# Patient Record
Sex: FEMALE | Race: WHITE | NOT HISPANIC OR LATINO | Employment: OTHER | URBAN - METROPOLITAN AREA
[De-identification: names, ages, dates, MRNs, and addresses within clinical notes are randomized per-mention and may not be internally consistent; named-entity substitution may affect disease eponyms.]

---

## 2020-03-03 ENCOUNTER — PATIENT MESSAGE (OUTPATIENT)
Dept: OBSTETRICS AND GYNECOLOGY | Facility: CLINIC | Age: 70
End: 2020-03-03

## 2020-03-03 ENCOUNTER — OFFICE VISIT (OUTPATIENT)
Dept: OBSTETRICS AND GYNECOLOGY | Facility: CLINIC | Age: 70
End: 2020-03-03
Payer: MEDICARE

## 2020-03-03 VITALS
SYSTOLIC BLOOD PRESSURE: 118 MMHG | DIASTOLIC BLOOD PRESSURE: 90 MMHG | WEIGHT: 114.5 LBS | BODY MASS INDEX: 22.48 KG/M2 | HEIGHT: 60 IN

## 2020-03-03 DIAGNOSIS — Z01.419 WELL WOMAN EXAM WITH ROUTINE GYNECOLOGICAL EXAM: Primary | ICD-10-CM

## 2020-03-03 PROCEDURE — G0101 CA SCREEN;PELVIC/BREAST EXAM: HCPCS | Mod: PBBFAC,PO | Performed by: OBSTETRICS & GYNECOLOGY

## 2020-03-03 PROCEDURE — 99203 OFFICE O/P NEW LOW 30 MIN: CPT | Mod: PBBFAC,PO,25 | Performed by: OBSTETRICS & GYNECOLOGY

## 2020-03-03 PROCEDURE — 88175 CYTOPATH C/V AUTO FLUID REDO: CPT

## 2020-03-03 PROCEDURE — G0101 PR CA SCREEN;PELVIC/BREAST EXAM: ICD-10-PCS | Mod: S$PBB,,, | Performed by: OBSTETRICS & GYNECOLOGY

## 2020-03-03 PROCEDURE — G0101 CA SCREEN;PELVIC/BREAST EXAM: HCPCS | Mod: S$PBB,,, | Performed by: OBSTETRICS & GYNECOLOGY

## 2020-03-03 PROCEDURE — 99999 PR PBB SHADOW E&M-NEW PATIENT-LVL III: CPT | Mod: PBBFAC,,, | Performed by: OBSTETRICS & GYNECOLOGY

## 2020-03-03 PROCEDURE — 99999 PR PBB SHADOW E&M-NEW PATIENT-LVL III: ICD-10-PCS | Mod: PBBFAC,,, | Performed by: OBSTETRICS & GYNECOLOGY

## 2020-03-03 NOTE — PROGRESS NOTES
HPI:   69 y.o.   OB History        0    Para   0    Term   0       0    AB   0    Living   0       SAB   0    TAB   0    Ectopic   0    Multiple   0    Live Births                  No LMP recorded. Patient is postmenopausal.    Patient is  here for her annual gynecologic exam.  She has no complaints.     ROS:  GENERAL: No fever, chills, fatigability or weight loss.  SKIN: No rashes, itching or changes in color or texture of skin.  HEAD: No headaches or recent head trauma.  EYES: Visual acuity fine. No photophobia, ocular pain or diplopia.  EARS: Denies ear pain, discharge or vertigo.  NOSE: No loss of smell, no epistaxis or postnasal drip.  MOUTH & THROAT: No hoarseness or change in voice. No excessive gum bleeding.  NODES: Denies swollen glands.  CHEST: Denies BURKS, cyanosis, wheezing, cough and sputum production.  CARDIOVASCULAR: Denies chest pain, PND, orthopnea or reduced exercise tolerance.  ABDOMEN: Appetite fine. No weight loss. Denies diarrhea, abdominal pain, hematemesis or blood in stool.  URINARY: No flank pain, dysuria or hematuria.  PERIPHERAL VASCULAR: No claudication or cyanosis.  MUSCULOSKELETAL: No joint stiffness or swelling. Denies back pain.  NEUROLOGIC: No history of seizures, paralysis, alteration of gait or coordination.    PE:   BP (!) 118/90   Ht 5' (1.524 m)   Wt 51.9 kg (114 lb 8.5 oz)   BMI 22.37 kg/m²   APPEARANCE: Well nourished, well developed, in no acute distress.  NECK: Neck symmetric without masses or thyromegaly.  BREASTS: Symmetrical, no skin changes or visible lesions. No palpable masses, nipple discharge or adenopathy bilaterally.  ABDOMEN: Flat. Soft. No tenderness or masses. No hepatosplenomegaly. No hernias. No CVA tenderness.  VULVA: No lesions. Normal female genitalia.  URETHRAL MEATUS: Normal size and location, no lesions, no prolapse.  URETHRA: No masses, tenderness, prolapse or scarring.  VAGINA: Moist and well rugated, no discharge, no significant  cystocele or rectocele.  CERVIX: No lesions and discharge. PAP done.  UTERUS: Normal size, regular shape, mobile, non-tender, bladder base nontender.  ADNEXA: No masses, tenderness or CDS nodularity.  ANUS PERINEUM: Normal.    PROCEDURES:  Pap smear    Assessment:  Normal Gynecologic Exam    Plan:  Mammogram and Colonoscopy if indicated by current recommendations.  Return to clinic in one year or for any problems or complaints.  No gyn co  Needs to return to dis for mammo views

## 2020-03-30 LAB
FINAL PATHOLOGIC DIAGNOSIS: NORMAL
Lab: NORMAL

## 2020-03-31 ENCOUNTER — TELEPHONE (OUTPATIENT)
Dept: OBSTETRICS AND GYNECOLOGY | Facility: CLINIC | Age: 70
End: 2020-03-31

## 2020-03-31 NOTE — TELEPHONE ENCOUNTER
----- Message from Michael A. Wiedemann, MD sent at 3/31/2020 12:05 PM CDT -----  Notify the patient that her pap was normal

## 2020-11-23 ENCOUNTER — PATIENT MESSAGE (OUTPATIENT)
Dept: OBSTETRICS AND GYNECOLOGY | Facility: CLINIC | Age: 70
End: 2020-11-23

## 2021-02-24 ENCOUNTER — IMMUNIZATION (OUTPATIENT)
Dept: OBSTETRICS AND GYNECOLOGY | Facility: CLINIC | Age: 71
End: 2021-02-24
Payer: MEDICARE

## 2021-02-24 DIAGNOSIS — Z23 NEED FOR VACCINATION: Primary | ICD-10-CM

## 2021-02-24 PROCEDURE — 91300 COVID-19, MRNA, LNP-S, PF, 30 MCG/0.3 ML DOSE VACCINE: CPT | Mod: PBBFAC | Performed by: FAMILY MEDICINE

## 2021-03-10 ENCOUNTER — PATIENT OUTREACH (OUTPATIENT)
Dept: ADMINISTRATIVE | Facility: HOSPITAL | Age: 71
End: 2021-03-10

## 2021-03-10 ENCOUNTER — OFFICE VISIT (OUTPATIENT)
Dept: INTERNAL MEDICINE | Facility: CLINIC | Age: 71
End: 2021-03-10
Payer: MEDICARE

## 2021-03-10 VITALS
RESPIRATION RATE: 16 BRPM | WEIGHT: 112.44 LBS | BODY MASS INDEX: 22.07 KG/M2 | HEART RATE: 88 BPM | HEIGHT: 60 IN | SYSTOLIC BLOOD PRESSURE: 114 MMHG | DIASTOLIC BLOOD PRESSURE: 78 MMHG | TEMPERATURE: 98 F | OXYGEN SATURATION: 97 %

## 2021-03-10 DIAGNOSIS — Z12.11 SCREEN FOR COLON CANCER: ICD-10-CM

## 2021-03-10 DIAGNOSIS — E78.5 HYPERLIPIDEMIA, UNSPECIFIED HYPERLIPIDEMIA TYPE: ICD-10-CM

## 2021-03-10 DIAGNOSIS — Z00.00 ANNUAL PHYSICAL EXAM: ICD-10-CM

## 2021-03-10 DIAGNOSIS — E03.8 OTHER SPECIFIED HYPOTHYROIDISM: Primary | Chronic | ICD-10-CM

## 2021-03-10 PROCEDURE — 99204 PR OFFICE/OUTPT VISIT, NEW, LEVL IV, 45-59 MIN: ICD-10-PCS | Mod: S$PBB,,, | Performed by: INTERNAL MEDICINE

## 2021-03-10 PROCEDURE — 99999 PR PBB SHADOW E&M-EST. PATIENT-LVL III: ICD-10-PCS | Mod: PBBFAC,,, | Performed by: INTERNAL MEDICINE

## 2021-03-10 PROCEDURE — 99999 PR PBB SHADOW E&M-EST. PATIENT-LVL III: CPT | Mod: PBBFAC,,, | Performed by: INTERNAL MEDICINE

## 2021-03-10 PROCEDURE — 99204 OFFICE O/P NEW MOD 45 MIN: CPT | Mod: S$PBB,,, | Performed by: INTERNAL MEDICINE

## 2021-03-10 PROCEDURE — 99213 OFFICE O/P EST LOW 20 MIN: CPT | Mod: PBBFAC,PO | Performed by: INTERNAL MEDICINE

## 2021-03-10 RX ORDER — LEVOTHYROXINE SODIUM 50 UG/1
50 TABLET ORAL
Qty: 90 TABLET | Refills: 3 | Status: SHIPPED | OUTPATIENT
Start: 2021-03-10 | End: 2022-05-09

## 2021-03-10 RX ORDER — VARICELLA-ZOSTER GE VAC,2 OF 2 50 MCG
1 VIAL (EA) INTRAMUSCULAR ONCE
Qty: 1 EACH | Refills: 1 | Status: SHIPPED | OUTPATIENT
Start: 2021-03-10 | End: 2021-03-10

## 2021-03-17 ENCOUNTER — IMMUNIZATION (OUTPATIENT)
Dept: OBSTETRICS AND GYNECOLOGY | Facility: CLINIC | Age: 71
End: 2021-03-17
Payer: MEDICARE

## 2021-03-17 DIAGNOSIS — Z23 NEED FOR VACCINATION: Primary | ICD-10-CM

## 2021-03-17 PROCEDURE — 91300 COVID-19, MRNA, LNP-S, PF, 30 MCG/0.3 ML DOSE VACCINE: CPT | Mod: PBBFAC | Performed by: FAMILY MEDICINE

## 2021-03-17 PROCEDURE — 0002A COVID-19, MRNA, LNP-S, PF, 30 MCG/0.3 ML DOSE VACCINE: CPT | Mod: PBBFAC | Performed by: FAMILY MEDICINE

## 2021-03-30 ENCOUNTER — LAB VISIT (OUTPATIENT)
Dept: LAB | Facility: HOSPITAL | Age: 71
End: 2021-03-30
Attending: INTERNAL MEDICINE
Payer: MEDICARE

## 2021-03-30 DIAGNOSIS — E78.5 HYPERLIPIDEMIA, UNSPECIFIED HYPERLIPIDEMIA TYPE: ICD-10-CM

## 2021-03-30 DIAGNOSIS — E03.8 OTHER SPECIFIED HYPOTHYROIDISM: Chronic | ICD-10-CM

## 2021-03-30 LAB
ALBUMIN SERPL BCP-MCNC: 4.1 G/DL (ref 3.5–5.2)
ALP SERPL-CCNC: 51 U/L (ref 55–135)
ALT SERPL W/O P-5'-P-CCNC: 14 U/L (ref 10–44)
ANION GAP SERPL CALC-SCNC: 10 MMOL/L (ref 8–16)
AST SERPL-CCNC: 19 U/L (ref 10–40)
BASOPHILS # BLD AUTO: 0.06 K/UL (ref 0–0.2)
BASOPHILS NFR BLD: 1.2 % (ref 0–1.9)
BILIRUB SERPL-MCNC: 1.3 MG/DL (ref 0.1–1)
BUN SERPL-MCNC: 19 MG/DL (ref 8–23)
CALCIUM SERPL-MCNC: 8.9 MG/DL (ref 8.7–10.5)
CHLORIDE SERPL-SCNC: 99 MMOL/L (ref 95–110)
CHOLEST SERPL-MCNC: 260 MG/DL (ref 120–199)
CHOLEST/HDLC SERPL: 2.7 {RATIO} (ref 2–5)
CO2 SERPL-SCNC: 26 MMOL/L (ref 23–29)
CREAT SERPL-MCNC: 0.8 MG/DL (ref 0.5–1.4)
DIFFERENTIAL METHOD: NORMAL
EOSINOPHIL # BLD AUTO: 0 K/UL (ref 0–0.5)
EOSINOPHIL NFR BLD: 0.8 % (ref 0–8)
ERYTHROCYTE [DISTWIDTH] IN BLOOD BY AUTOMATED COUNT: 13 % (ref 11.5–14.5)
EST. GFR  (AFRICAN AMERICAN): >60 ML/MIN/1.73 M^2
EST. GFR  (NON AFRICAN AMERICAN): >60 ML/MIN/1.73 M^2
GLUCOSE SERPL-MCNC: 91 MG/DL (ref 70–110)
HCT VFR BLD AUTO: 40.6 % (ref 37–48.5)
HDLC SERPL-MCNC: 95 MG/DL (ref 40–75)
HDLC SERPL: 36.5 % (ref 20–50)
HGB BLD-MCNC: 13.6 G/DL (ref 12–16)
IMM GRANULOCYTES # BLD AUTO: 0.02 K/UL (ref 0–0.04)
IMM GRANULOCYTES NFR BLD AUTO: 0.4 % (ref 0–0.5)
LDLC SERPL CALC-MCNC: 154.6 MG/DL (ref 63–159)
LYMPHOCYTES # BLD AUTO: 1.3 K/UL (ref 1–4.8)
LYMPHOCYTES NFR BLD: 26.6 % (ref 18–48)
MCH RBC QN AUTO: 29.8 PG (ref 27–31)
MCHC RBC AUTO-ENTMCNC: 33.5 G/DL (ref 32–36)
MCV RBC AUTO: 89 FL (ref 82–98)
MONOCYTES # BLD AUTO: 0.4 K/UL (ref 0.3–1)
MONOCYTES NFR BLD: 8.3 % (ref 4–15)
NEUTROPHILS # BLD AUTO: 3 K/UL (ref 1.8–7.7)
NEUTROPHILS NFR BLD: 62.7 % (ref 38–73)
NONHDLC SERPL-MCNC: 165 MG/DL
NRBC BLD-RTO: 0 /100 WBC
PLATELET # BLD AUTO: 202 K/UL (ref 150–450)
PMV BLD AUTO: 11.5 FL (ref 9.2–12.9)
POTASSIUM SERPL-SCNC: 4.3 MMOL/L (ref 3.5–5.1)
PROT SERPL-MCNC: 7.2 G/DL (ref 6–8.4)
RBC # BLD AUTO: 4.56 M/UL (ref 4–5.4)
SODIUM SERPL-SCNC: 135 MMOL/L (ref 136–145)
TRIGL SERPL-MCNC: 52 MG/DL (ref 30–150)
TSH SERPL DL<=0.005 MIU/L-ACNC: 1.25 UIU/ML (ref 0.4–4)
WBC # BLD AUTO: 4.82 K/UL (ref 3.9–12.7)

## 2021-03-30 PROCEDURE — 84443 ASSAY THYROID STIM HORMONE: CPT | Performed by: INTERNAL MEDICINE

## 2021-03-30 PROCEDURE — 36415 COLL VENOUS BLD VENIPUNCTURE: CPT | Mod: PO | Performed by: INTERNAL MEDICINE

## 2021-03-30 PROCEDURE — 85025 COMPLETE CBC W/AUTO DIFF WBC: CPT | Performed by: INTERNAL MEDICINE

## 2021-03-30 PROCEDURE — 80053 COMPREHEN METABOLIC PANEL: CPT | Performed by: INTERNAL MEDICINE

## 2021-03-30 PROCEDURE — 80061 LIPID PANEL: CPT | Performed by: INTERNAL MEDICINE

## 2022-03-25 ENCOUNTER — PES CALL (OUTPATIENT)
Dept: ADMINISTRATIVE | Facility: CLINIC | Age: 72
End: 2022-03-25
Payer: MEDICARE

## 2022-04-05 ENCOUNTER — OFFICE VISIT (OUTPATIENT)
Dept: INTERNAL MEDICINE | Facility: CLINIC | Age: 72
End: 2022-04-05
Payer: MEDICARE

## 2022-04-05 VITALS
WEIGHT: 106.94 LBS | SYSTOLIC BLOOD PRESSURE: 130 MMHG | DIASTOLIC BLOOD PRESSURE: 82 MMHG | HEIGHT: 61 IN | OXYGEN SATURATION: 96 % | HEART RATE: 81 BPM | RESPIRATION RATE: 17 BRPM | BODY MASS INDEX: 20.19 KG/M2 | TEMPERATURE: 98 F

## 2022-04-05 DIAGNOSIS — E78.5 HYPERLIPIDEMIA, UNSPECIFIED HYPERLIPIDEMIA TYPE: Primary | ICD-10-CM

## 2022-04-05 DIAGNOSIS — Z12.31 VISIT FOR SCREENING MAMMOGRAM: ICD-10-CM

## 2022-04-05 DIAGNOSIS — Z12.11 SCREEN FOR COLON CANCER: ICD-10-CM

## 2022-04-05 DIAGNOSIS — Z00.00 ANNUAL PHYSICAL EXAM: ICD-10-CM

## 2022-04-05 DIAGNOSIS — F41.9 ANXIETY: ICD-10-CM

## 2022-04-05 DIAGNOSIS — B35.1 ONYCHOMYCOSIS: ICD-10-CM

## 2022-04-05 DIAGNOSIS — E03.8 OTHER SPECIFIED HYPOTHYROIDISM: Chronic | ICD-10-CM

## 2022-04-05 PROCEDURE — 99214 OFFICE O/P EST MOD 30 MIN: CPT | Mod: PBBFAC,PO | Performed by: INTERNAL MEDICINE

## 2022-04-05 PROCEDURE — 99999 PR PBB SHADOW E&M-EST. PATIENT-LVL IV: CPT | Mod: PBBFAC,,, | Performed by: INTERNAL MEDICINE

## 2022-04-05 PROCEDURE — 99214 PR OFFICE/OUTPT VISIT, EST, LEVL IV, 30-39 MIN: ICD-10-PCS | Mod: S$PBB,,, | Performed by: INTERNAL MEDICINE

## 2022-04-05 PROCEDURE — 90714 TD VACC NO PRESV 7 YRS+ IM: CPT | Mod: PBBFAC,PO

## 2022-04-05 PROCEDURE — 99214 OFFICE O/P EST MOD 30 MIN: CPT | Mod: S$PBB,,, | Performed by: INTERNAL MEDICINE

## 2022-04-05 PROCEDURE — 99999 PR PBB SHADOW E&M-EST. PATIENT-LVL IV: ICD-10-PCS | Mod: PBBFAC,,, | Performed by: INTERNAL MEDICINE

## 2022-04-05 RX ORDER — VARICELLA-ZOSTER GE VAC,2 OF 2 50 MCG
1 VIAL (EA) INTRAMUSCULAR ONCE
Qty: 1 EACH | Refills: 1 | Status: SHIPPED | OUTPATIENT
Start: 2022-04-05 | End: 2022-04-05

## 2022-04-05 RX ORDER — PENICILLIN V POTASSIUM 500 MG/1
500 TABLET, FILM COATED ORAL EVERY 6 HOURS
COMMUNITY
Start: 2022-03-29 | End: 2023-04-05

## 2022-04-05 RX ORDER — TRAMADOL HYDROCHLORIDE 50 MG/1
50 TABLET ORAL
COMMUNITY
Start: 2022-03-29 | End: 2023-04-05

## 2022-04-05 RX ORDER — IBUPROFEN 800 MG/1
800 TABLET ORAL EVERY 8 HOURS PRN
COMMUNITY
Start: 2022-03-29 | End: 2023-04-05

## 2022-04-05 NOTE — PROGRESS NOTES
Subjective:       Patient ID: Danielle Longo is a 71 y.o. female.    Chief Complaint: Follow-up    HPI    71 y.o. female here for follow-up of chronic medical conditions.     Patient has hypothyroid and is on Synthroid 50 mcg daily.    She has stress and anxiety and is nervous about putting her house back together.  She sometimes gets so nervous that she cannot do anything.    She is concerned about spider veins.    She has toenail fungus.    HLD - Patient is currently on no current medication.  Her last lipid panel was   Cholesterol   Date Value Ref Range Status   03/30/2021 260 (H) 120 - 199 mg/dL Final     Comment:     The National Cholesterol Education Program (NCEP) has set the  following guidelines (reference ranges) for Cholesterol:  Optimal.....................<200 mg/dL  Borderline High.............200-239 mg/dL  High........................> or = 240 mg/dL       Triglycerides   Date Value Ref Range Status   03/30/2021 52 30 - 150 mg/dL Final     Comment:     The National Cholesterol Education Program (NCEP) has set the  following guidelines (reference values) for triglycerides:  Normal......................<150 mg/dL  Borderline High.............150-199 mg/dL  High........................200-499 mg/dL       HDL   Date Value Ref Range Status   03/30/2021 95 (H) 40 - 75 mg/dL Final     Comment:     The National Cholesterol Education Program (NCEP) has set the  following guidelines (reference values) for HDL Cholesterol:  Low...............<40 mg/dL  Optimal...........>60 mg/dL       LDL Cholesterol   Date Value Ref Range Status   03/30/2021 154.6 63.0 - 159.0 mg/dL Final     Comment:     The National Cholesterol Education Program (NCEP) has set the  following guidelines (reference values) for LDL Cholesterol:  Optimal.......................<130 mg/dL  Borderline High...............130-159 mg/dL  High..........................160-189 mg/dL  Very High.....................>190 mg/dL     .  Side effects of the  medication:  None.    Cholesterol: needs  Vaccines: Influenza - ; Tetanus - needs; Pneumovax - 2020; Prevnar - 2017; Zoster - needs; COVID - 3 done  Sexual Screening: not active  STD screening: not active  Eye exam: done yearly (last in October)  Mammogram: needs  Gyn exam: scheduled  Colonoscopy: couldn't tolerate the prep - vomited all night.  DEXA: osteopenia; 2020    Exercise: walks 45-60 minutes 5 days a week.  Diet: home cooked, take out.  Eats a lot of salad and salmon.    Past Medical History:   Diagnosis Date    Hyperlipidemia     Hypothyroidism     Thyroid disease      Past Surgical History:   Procedure Laterality Date    cataract       EYE SURGERY  2018    cataracts     Social History     Socioeconomic History    Marital status:    Tobacco Use    Smoking status: Former Smoker     Packs/day: 0.00     Years: 5.00     Pack years: 0.00     Types: Cigarettes     Start date: 3/20/1974     Quit date: 1979     Years since quittin.5    Smokeless tobacco: Never Used    Tobacco comment: social smoker, less than a pack a week   Substance and Sexual Activity    Alcohol use: Yes     Alcohol/week: 5.0 standard drinks     Types: 5 Glasses of wine per week     Comment: glass of wine nightly    Drug use: Never    Sexual activity: Not Currently     Partners: Male     Social Determinants of Health     Financial Resource Strain: Low Risk     Difficulty of Paying Living Expenses: Not very hard   Food Insecurity: No Food Insecurity    Worried About Running Out of Food in the Last Year: Never true    Ran Out of Food in the Last Year: Never true   Transportation Needs: No Transportation Needs    Lack of Transportation (Medical): No    Lack of Transportation (Non-Medical): No   Physical Activity: Sufficiently Active    Days of Exercise per Week: 5 days    Minutes of Exercise per Session: 30 min   Stress: Stress Concern Present    Feeling of Stress : To some extent   Social Connections:  Unknown    Frequency of Communication with Friends and Family: Three times a week    Frequency of Social Gatherings with Friends and Family: Twice a week    Active Member of Clubs or Organizations: No    Marital Status:    Housing Stability: Low Risk     Unable to Pay for Housing in the Last Year: No    Number of Places Lived in the Last Year: 1    Unstable Housing in the Last Year: No     Review of patient's allergies indicates:  No Known Allergies  Danielle Longo had no medications administered during this visit.    Review of Systems   Constitutional: Positive for activity change. Negative for unexpected weight change.   HENT: Negative for hearing loss and trouble swallowing.    Eyes: Negative for discharge.   Respiratory: Negative for chest tightness and wheezing.    Cardiovascular: Negative for chest pain and palpitations.   Gastrointestinal: Negative for constipation, diarrhea and vomiting.   Genitourinary: Negative for difficulty urinating and hematuria.   Musculoskeletal: Negative for neck pain.   Neurological: Positive for headaches.   Psychiatric/Behavioral: Positive for dysphoric mood.         Objective:      Physical Exam  Vitals reviewed.   Constitutional:       Appearance: She is well-developed.   HENT:      Head: Normocephalic and atraumatic.      Mouth/Throat:      Pharynx: No oropharyngeal exudate.   Eyes:      General: No scleral icterus.        Right eye: No discharge.         Left eye: No discharge.      Pupils: Pupils are equal, round, and reactive to light.   Neck:      Thyroid: No thyromegaly.      Trachea: No tracheal deviation.   Cardiovascular:      Rate and Rhythm: Normal rate and regular rhythm.      Heart sounds: Normal heart sounds. No murmur heard.    No friction rub. No gallop.   Pulmonary:      Effort: Pulmonary effort is normal. No respiratory distress.      Breath sounds: Normal breath sounds. No wheezing or rales.   Chest:      Chest wall: No tenderness.   Abdominal:       General: Bowel sounds are normal. There is no distension.      Palpations: Abdomen is soft. There is no mass.      Tenderness: There is no abdominal tenderness. There is no guarding or rebound.   Musculoskeletal:         General: No tenderness. Normal range of motion.      Cervical back: Normal range of motion and neck supple.   Skin:     General: Skin is warm and dry.      Coloration: Skin is not pale.      Findings: No erythema or rash.   Neurological:      Mental Status: She is alert and oriented to person, place, and time.   Psychiatric:         Behavior: Behavior normal.         Assessment:       1. Hyperlipidemia, unspecified hyperlipidemia type  - CBC Auto Differential; Future  - Comprehensive Metabolic Panel; Future  - TSH; Future  - Lipid Panel; Future    2. Other specified hypothyroidism    3. Anxiety    4. Visit for screening mammogram  - Mammo Digital Screening Bilat; Future    5. Screen for colon cancer  - Cologuard Screening (Multitarget Stool DNA); Future  - Cologuard Screening (Multitarget Stool DNA)    6. Onychomycosis    7. Annual physical exam      Plan:       1.  Monitor.  2. Continue Synthroid 50 mcg daily.  3. Continue with exercise.   4. Check mammogram.  5. Check Cologuard.  6. Recommend over-the-counter Lamisil twice daily for the next 6 months.  7. Check CBC, CMP, TSH, lipids.  Discussed diet and exercise.  Up-to-date on vaccines.

## 2022-04-16 LAB
ALBUMIN SERPL-MCNC: 4.4 G/DL (ref 3.6–5.1)
ALBUMIN/GLOB SERPL: 1.8 (CALC) (ref 1–2.5)
ALP SERPL-CCNC: 48 U/L (ref 37–153)
ALT SERPL-CCNC: 10 U/L (ref 6–29)
AST SERPL-CCNC: 13 U/L (ref 10–35)
BASOPHILS # BLD AUTO: 52 CELLS/UL (ref 0–200)
BASOPHILS NFR BLD AUTO: 1.4 %
BILIRUB SERPL-MCNC: 1.2 MG/DL (ref 0.2–1.2)
BUN SERPL-MCNC: 15 MG/DL (ref 7–25)
BUN/CREAT SERPL: NORMAL (CALC) (ref 6–22)
CALCIUM SERPL-MCNC: 9.5 MG/DL (ref 8.6–10.4)
CHLORIDE SERPL-SCNC: 98 MMOL/L (ref 98–110)
CHOLEST SERPL-MCNC: 268 MG/DL
CHOLEST/HDLC SERPL: 2.9 (CALC)
CO2 SERPL-SCNC: 25 MMOL/L (ref 20–32)
CREAT SERPL-MCNC: 0.63 MG/DL (ref 0.6–0.93)
EOSINOPHIL # BLD AUTO: 100 CELLS/UL (ref 15–500)
EOSINOPHIL NFR BLD AUTO: 2.7 %
ERYTHROCYTE [DISTWIDTH] IN BLOOD BY AUTOMATED COUNT: 12.2 % (ref 11–15)
GLOBULIN SER CALC-MCNC: 2.4 G/DL (CALC) (ref 1.9–3.7)
GLUCOSE SERPL-MCNC: 91 MG/DL (ref 65–99)
HCT VFR BLD AUTO: 42.2 % (ref 35–45)
HDLC SERPL-MCNC: 92 MG/DL
HGB BLD-MCNC: 13.9 G/DL (ref 11.7–15.5)
LDLC SERPL CALC-MCNC: 160 MG/DL (CALC)
LYMPHOCYTES # BLD AUTO: 1032 CELLS/UL (ref 850–3900)
LYMPHOCYTES NFR BLD AUTO: 27.9 %
MCH RBC QN AUTO: 29 PG (ref 27–33)
MCHC RBC AUTO-ENTMCNC: 32.9 G/DL (ref 32–36)
MCV RBC AUTO: 88.1 FL (ref 80–100)
MONOCYTES # BLD AUTO: 422 CELLS/UL (ref 200–950)
MONOCYTES NFR BLD AUTO: 11.4 %
NEUTROPHILS # BLD AUTO: 2094 CELLS/UL (ref 1500–7800)
NEUTROPHILS NFR BLD AUTO: 56.6 %
NONHDLC SERPL-MCNC: 176 MG/DL (CALC)
PLATELET # BLD AUTO: 196 THOUSAND/UL (ref 140–400)
PMV BLD REES-ECKER: 10.8 FL (ref 7.5–12.5)
POTASSIUM SERPL-SCNC: 4.3 MMOL/L (ref 3.5–5.3)
PROT SERPL-MCNC: 6.8 G/DL (ref 6.1–8.1)
RBC # BLD AUTO: 4.79 MILLION/UL (ref 3.8–5.1)
SODIUM SERPL-SCNC: 136 MMOL/L (ref 135–146)
TRIGL SERPL-MCNC: 64 MG/DL
TSH SERPL-ACNC: 1.28 MIU/L (ref 0.4–4.5)
WBC # BLD AUTO: 3.7 THOUSAND/UL (ref 3.8–10.8)

## 2022-05-30 ENCOUNTER — PATIENT MESSAGE (OUTPATIENT)
Dept: ADMINISTRATIVE | Facility: HOSPITAL | Age: 72
End: 2022-05-30
Payer: MEDICARE

## 2022-07-12 ENCOUNTER — PATIENT MESSAGE (OUTPATIENT)
Dept: ADMINISTRATIVE | Facility: HOSPITAL | Age: 72
End: 2022-07-12
Payer: MEDICARE

## 2022-07-22 ENCOUNTER — PATIENT MESSAGE (OUTPATIENT)
Dept: INTERNAL MEDICINE | Facility: CLINIC | Age: 72
End: 2022-07-22
Payer: MEDICARE

## 2022-10-10 ENCOUNTER — PATIENT MESSAGE (OUTPATIENT)
Dept: OBSTETRICS AND GYNECOLOGY | Facility: CLINIC | Age: 72
End: 2022-10-10
Payer: MEDICARE

## 2022-10-10 ENCOUNTER — PATIENT MESSAGE (OUTPATIENT)
Dept: ADMINISTRATIVE | Facility: HOSPITAL | Age: 72
End: 2022-10-10
Payer: MEDICARE

## 2022-10-10 DIAGNOSIS — Z12.31 VISIT FOR SCREENING MAMMOGRAM: Primary | ICD-10-CM

## 2022-10-11 ENCOUNTER — PATIENT MESSAGE (OUTPATIENT)
Dept: INTERNAL MEDICINE | Facility: CLINIC | Age: 72
End: 2022-10-11
Payer: MEDICARE

## 2022-10-19 LAB — BCS RECOMMENDATION EXT: NORMAL

## 2022-11-07 ENCOUNTER — PATIENT OUTREACH (OUTPATIENT)
Dept: ADMINISTRATIVE | Facility: HOSPITAL | Age: 72
End: 2022-11-07
Payer: MEDICARE

## 2023-03-07 ENCOUNTER — TELEPHONE (OUTPATIENT)
Dept: FAMILY MEDICINE | Facility: CLINIC | Age: 73
End: 2023-03-07
Payer: MEDICARE

## 2023-03-07 ENCOUNTER — PATIENT MESSAGE (OUTPATIENT)
Dept: FAMILY MEDICINE | Facility: CLINIC | Age: 73
End: 2023-03-07
Payer: MEDICARE

## 2023-03-10 ENCOUNTER — TELEPHONE (OUTPATIENT)
Dept: FAMILY MEDICINE | Facility: CLINIC | Age: 73
End: 2023-03-10
Payer: MEDICARE

## 2023-03-10 NOTE — TELEPHONE ENCOUNTER
Left a voicemail message to inform the Patient about the [facility] appointment and to schedule.  Requested the Patient to call the office back to be schedule.  Sent a detailed message thru [employer] as well.

## 2023-03-23 RX ORDER — LEVOTHYROXINE SODIUM 50 UG/1
50 TABLET ORAL
Qty: 90 TABLET | Refills: 0 | Status: SHIPPED | OUTPATIENT
Start: 2023-03-23 | End: 2023-04-05 | Stop reason: SDUPTHER

## 2023-03-23 NOTE — TELEPHONE ENCOUNTER
No new care gaps identified.  [facility] Embedded Care Gaps. Reference number: [id number]. 3/23/2023   2:09:18 AM CDT

## 2023-03-23 NOTE — TELEPHONE ENCOUNTER
Refill Decision Note   Danielle Longo  is requesting a refill authorization.  Brief Assessment and Rationale for Refill:  Approve     Medication Therapy Plan:       Medication Reconciliation Completed: No    Comments:     No Care Gaps recommended.     Note composed:10:15 AM 03/23/2023

## 2023-04-05 ENCOUNTER — OFFICE VISIT (OUTPATIENT)
Dept: INTERNAL MEDICINE | Facility: CLINIC | Age: 73
End: 2023-04-05
Payer: MEDICARE

## 2023-04-05 ENCOUNTER — PATIENT MESSAGE (OUTPATIENT)
Dept: ADMINISTRATIVE | Facility: HOSPITAL | Age: 73
End: 2023-04-05
Payer: MEDICARE

## 2023-04-05 VITALS
HEART RATE: 74 BPM | DIASTOLIC BLOOD PRESSURE: 70 MMHG | SYSTOLIC BLOOD PRESSURE: 118 MMHG | BODY MASS INDEX: 20.65 KG/M2 | RESPIRATION RATE: 16 BRPM | WEIGHT: 109.38 LBS | HEIGHT: 61 IN | TEMPERATURE: 98 F | OXYGEN SATURATION: 97 %

## 2023-04-05 DIAGNOSIS — R53.83 FATIGUE, UNSPECIFIED TYPE: ICD-10-CM

## 2023-04-05 DIAGNOSIS — E78.5 HYPERLIPIDEMIA, UNSPECIFIED HYPERLIPIDEMIA TYPE: Primary | ICD-10-CM

## 2023-04-05 DIAGNOSIS — E03.8 OTHER SPECIFIED HYPOTHYROIDISM: Chronic | ICD-10-CM

## 2023-04-05 DIAGNOSIS — Z00.00 ANNUAL PHYSICAL EXAM: ICD-10-CM

## 2023-04-05 DIAGNOSIS — Z12.11 SCREENING FOR COLON CANCER: ICD-10-CM

## 2023-04-05 PROCEDURE — 99999 PR PBB SHADOW E&M-EST. PATIENT-LVL III: CPT | Mod: PBBFAC,,, | Performed by: INTERNAL MEDICINE

## 2023-04-05 PROCEDURE — 99214 PR OFFICE/OUTPT VISIT, EST, LEVL IV, 30-39 MIN: ICD-10-PCS | Mod: S$PBB,,, | Performed by: INTERNAL MEDICINE

## 2023-04-05 PROCEDURE — 99213 OFFICE O/P EST LOW 20 MIN: CPT | Mod: PBBFAC,PO | Performed by: INTERNAL MEDICINE

## 2023-04-05 PROCEDURE — 99214 OFFICE O/P EST MOD 30 MIN: CPT | Mod: S$PBB,,, | Performed by: INTERNAL MEDICINE

## 2023-04-05 PROCEDURE — 99999 PR PBB SHADOW E&M-EST. PATIENT-LVL III: ICD-10-PCS | Mod: PBBFAC,,, | Performed by: INTERNAL MEDICINE

## 2023-04-05 RX ORDER — VARICELLA-ZOSTER GE VAC,2 OF 2 50 MCG
1 VIAL (EA) INTRAMUSCULAR ONCE
Qty: 1 EACH | Refills: 1 | Status: SHIPPED | OUTPATIENT
Start: 2023-04-05 | End: 2023-04-05

## 2023-04-05 RX ORDER — LEVOTHYROXINE SODIUM 50 UG/1
50 TABLET ORAL
Qty: 90 TABLET | Refills: 3 | Status: SHIPPED | OUTPATIENT
Start: 2023-04-05 | End: 2023-06-21

## 2023-04-05 RX ORDER — TRAZODONE HYDROCHLORIDE 50 MG/1
50 TABLET ORAL NIGHTLY
Qty: 30 TABLET | Refills: 11 | Status: SHIPPED | OUTPATIENT
Start: 2023-04-05 | End: 2024-04-04

## 2023-04-05 NOTE — PROGRESS NOTES
Subjective:       Patient ID: Danielle Longo is a 72 y.o. female.    Chief Complaint: Follow-up and Fatigue    HPI    72 y.o. female here for follow-up of chronic medical conditions.     Patient has hypothyroid and is on Synthroid 50 mcg daily.    HLD - Patient is currently on no current medications.  Her last lipid panel was   Cholesterol   Date Value Ref Range Status   04/15/2022 268 (H) <200 mg/dL Final     Triglycerides   Date Value Ref Range Status   04/15/2022 64 <150 mg/dL Final     HDL   Date Value Ref Range Status   04/15/2022 92 > OR = 50 mg/dL Final     LDL Cholesterol   Date Value Ref Range Status   04/15/2022 160 (H) mg/dL (calc) Final     Comment:     Reference range: <100     Desirable range <100 mg/dL for primary prevention;    <70 mg/dL for patients with CHD or diabetic patients   with > or = 2 CHD risk factors.     LDL-C is now calculated using the Sunny   calculation, which is a validated novel method providing   better accuracy than the Friedewald equation in the   estimation of LDL-C.   Aldair OSPINA et al. MEMO. 2013;310(19): 4284-5700   (http://education.Endosee.Anturis/faq/GLL914)     .  Side effects of the medication: none.    She has fatigue.  This has been going on the last few months to 6 months.  The past few years have been hard.  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  She has to carry him around.  It has been tough for him.  She is more tired.  She used to be able to walk for miles without being tired.  She is older and is tired.  She does not have the energy she had before.  She sleeps 6 hours, maybe 7 at the most.  She fall asleep easily, but wakes up and cannot go back to sleep.  She gets up and is tired by the middle of the day.    Cholesterol: needs  Vaccines: Influenza - 2022; Tetanus - 2022; Prevnar 20 - 13 and 23 done; Zoster - needs; COVID - 5 done  Sexual Screening: not active  STD screening: no concern  Eye exam: done  last month  Mammogram: UTD  Gyn exam: UTD  Colonoscopy: couldn't do the prep.  Declined    Exercise: walks couple miles 5 days a week.  Diet: home cooked.  Mediterranean style diet.    Past Medical History:   Diagnosis Date    Hyperlipidemia     Hypothyroidism     Thyroid disease      Past Surgical History:   Procedure Laterality Date    cataract       EYE SURGERY  2018    cataracts     Social History     Socioeconomic History    Marital status:    Tobacco Use    Smoking status: Former     Packs/day: 0.00     Years: 5.00     Pack years: 0.00     Types: Cigarettes     Start date: 3/20/1974     Quit date: 1979     Years since quittin.5    Smokeless tobacco: Never    Tobacco comments:     social smoker, less than a pack a week   Substance and Sexual Activity    Alcohol use: Yes     Alcohol/week: 2.0 standard drinks     Types: 2 Glasses of wine per week     Comment: glass of wine 1-2 times a week    Drug use: Never    Sexual activity: Not Currently     Partners: Male     Review of patient's allergies indicates:  No Known Allergies  Danielle Longo had no medications administered during this visit.    Review of Systems   Constitutional:  Negative for activity change.   HENT:  Negative for hearing loss and trouble swallowing.    Eyes:  Negative for discharge.   Respiratory:  Negative for chest tightness and wheezing.    Cardiovascular:  Negative for chest pain and palpitations.   Gastrointestinal:  Negative for constipation, diarrhea and vomiting.   Genitourinary:  Negative for difficulty urinating and hematuria.   Neurological:  Negative for headaches.   Psychiatric/Behavioral:  Negative for dysphoric mood.        Objective:      Physical Exam  Vitals reviewed.   Constitutional:       Appearance: She is well-developed.   HENT:      Head: Normocephalic and atraumatic.      Mouth/Throat:      Pharynx: No oropharyngeal exudate.   Eyes:      General: No scleral icterus.        Right eye: No discharge.          Left eye: No discharge.      Pupils: Pupils are equal, round, and reactive to light.   Neck:      Thyroid: No thyromegaly.      Trachea: No tracheal deviation.   Cardiovascular:      Rate and Rhythm: Normal rate and regular rhythm.      Heart sounds: Normal heart sounds. No murmur heard.    No friction rub. No gallop.   Pulmonary:      Effort: Pulmonary effort is normal. No respiratory distress.      Breath sounds: Normal breath sounds. No wheezing or rales.   Chest:      Chest wall: No tenderness.   Abdominal:      General: Bowel sounds are normal. There is no distension.      Palpations: Abdomen is soft. There is no mass.      Tenderness: There is no abdominal tenderness. There is no guarding or rebound.   Musculoskeletal:         General: No tenderness. Normal range of motion.      Cervical back: Normal range of motion and neck supple.   Skin:     General: Skin is warm and dry.      Coloration: Skin is not pale.      Findings: No erythema or rash.   Neurological:      Mental Status: She is alert and oriented to person, place, and time.   Psychiatric:         Behavior: Behavior normal.       Assessment:       1. Hyperlipidemia, unspecified hyperlipidemia type  - Lipid Panel; Future  - CBC Auto Differential; Future  - Lipid Panel  - CBC Auto Differential  - TSH; Future  - Comprehensive Metabolic Panel; Future  - TSH  - Comprehensive Metabolic Panel    2. Other specified hypothyroidism    3. Fatigue, unspecified type    4. Annual physical exam      Plan:       1. Monitor.  2. Continue Synthroid 50 mcg daily.    3. Think this is related to poor sleep given recent stress.  Try trazodone 50 mg before sleep.    4. Check CBC, CMP, TSH, lipids.  Discussed diet and exercise.  Discussed vaccines.  Declined colonoscopy.

## 2023-04-07 LAB
ALBUMIN SERPL-MCNC: 4.3 G/DL (ref 3.6–5.1)
ALBUMIN/GLOB SERPL: 1.7 (CALC) (ref 1–2.5)
ALP SERPL-CCNC: 49 U/L (ref 37–153)
ALT SERPL-CCNC: 11 U/L (ref 6–29)
AST SERPL-CCNC: 15 U/L (ref 10–35)
BASOPHILS # BLD AUTO: 32 CELLS/UL (ref 0–200)
BASOPHILS NFR BLD AUTO: 0.7 %
BILIRUB SERPL-MCNC: 1.2 MG/DL (ref 0.2–1.2)
BUN SERPL-MCNC: 18 MG/DL (ref 7–25)
BUN/CREAT SERPL: ABNORMAL (CALC) (ref 6–22)
CALCIUM SERPL-MCNC: 9.2 MG/DL (ref 8.6–10.4)
CHLORIDE SERPL-SCNC: 98 MMOL/L (ref 98–110)
CHOLEST SERPL-MCNC: 286 MG/DL
CHOLEST/HDLC SERPL: 2.7 (CALC)
CO2 SERPL-SCNC: 27 MMOL/L (ref 20–32)
CREAT SERPL-MCNC: 0.69 MG/DL (ref 0.6–1)
EGFR: 93 ML/MIN/1.73M2
EOSINOPHIL # BLD AUTO: 81 CELLS/UL (ref 15–500)
EOSINOPHIL NFR BLD AUTO: 1.8 %
ERYTHROCYTE [DISTWIDTH] IN BLOOD BY AUTOMATED COUNT: 12.5 % (ref 11–15)
GLOBULIN SER CALC-MCNC: 2.6 G/DL (CALC) (ref 1.9–3.7)
GLUCOSE SERPL-MCNC: 92 MG/DL (ref 65–99)
HCT VFR BLD AUTO: 42.8 % (ref 35–45)
HDLC SERPL-MCNC: 106 MG/DL
HGB BLD-MCNC: 14.2 G/DL (ref 11.7–15.5)
LDLC SERPL CALC-MCNC: 167 MG/DL (CALC)
LYMPHOCYTES # BLD AUTO: 1481 CELLS/UL (ref 850–3900)
LYMPHOCYTES NFR BLD AUTO: 32.9 %
MCH RBC QN AUTO: 29.8 PG (ref 27–33)
MCHC RBC AUTO-ENTMCNC: 33.2 G/DL (ref 32–36)
MCV RBC AUTO: 89.7 FL (ref 80–100)
MONOCYTES # BLD AUTO: 378 CELLS/UL (ref 200–950)
MONOCYTES NFR BLD AUTO: 8.4 %
NEUTROPHILS # BLD AUTO: 2529 CELLS/UL (ref 1500–7800)
NEUTROPHILS NFR BLD AUTO: 56.2 %
NONHDLC SERPL-MCNC: 180 MG/DL (CALC)
PLATELET # BLD AUTO: 199 THOUSAND/UL (ref 140–400)
PMV BLD REES-ECKER: 11.6 FL (ref 7.5–12.5)
POTASSIUM SERPL-SCNC: 4.5 MMOL/L (ref 3.5–5.3)
PROT SERPL-MCNC: 6.9 G/DL (ref 6.1–8.1)
RBC # BLD AUTO: 4.77 MILLION/UL (ref 3.8–5.1)
SODIUM SERPL-SCNC: 133 MMOL/L (ref 135–146)
TRIGL SERPL-MCNC: 43 MG/DL
TSH SERPL-ACNC: 1.69 MIU/L (ref 0.4–4.5)
WBC # BLD AUTO: 4.5 THOUSAND/UL (ref 3.8–10.8)

## 2023-04-21 ENCOUNTER — PATIENT MESSAGE (OUTPATIENT)
Dept: ADMINISTRATIVE | Facility: HOSPITAL | Age: 73
End: 2023-04-21
Payer: MEDICARE

## 2023-05-02 LAB — HEMOCCULT STL QL IA: NEGATIVE

## 2023-05-17 ENCOUNTER — OFFICE VISIT (OUTPATIENT)
Dept: URGENT CARE | Facility: CLINIC | Age: 73
End: 2023-05-17
Payer: MEDICARE

## 2023-05-17 ENCOUNTER — NURSE TRIAGE (OUTPATIENT)
Dept: ADMINISTRATIVE | Facility: CLINIC | Age: 73
End: 2023-05-17
Payer: MEDICARE

## 2023-05-17 ENCOUNTER — TELEPHONE (OUTPATIENT)
Dept: INTERNAL MEDICINE | Facility: CLINIC | Age: 73
End: 2023-05-17
Payer: MEDICARE

## 2023-05-17 VITALS
RESPIRATION RATE: 18 BRPM | BODY MASS INDEX: 20.58 KG/M2 | HEIGHT: 61 IN | SYSTOLIC BLOOD PRESSURE: 165 MMHG | HEART RATE: 78 BPM | WEIGHT: 109 LBS | OXYGEN SATURATION: 95 % | TEMPERATURE: 98 F | DIASTOLIC BLOOD PRESSURE: 85 MMHG

## 2023-05-17 DIAGNOSIS — R19.7 DIARRHEA, UNSPECIFIED TYPE: ICD-10-CM

## 2023-05-17 DIAGNOSIS — R11.2 NAUSEA AND VOMITING, UNSPECIFIED VOMITING TYPE: Primary | ICD-10-CM

## 2023-05-17 PROCEDURE — 99203 OFFICE O/P NEW LOW 30 MIN: CPT | Mod: S$GLB,,,

## 2023-05-17 PROCEDURE — 99203 PR OFFICE/OUTPT VISIT, NEW, LEVL III, 30-44 MIN: ICD-10-PCS | Mod: S$GLB,,,

## 2023-05-17 RX ORDER — ONDANSETRON 8 MG/1
8 TABLET, ORALLY DISINTEGRATING ORAL
Status: COMPLETED | OUTPATIENT
Start: 2023-05-17 | End: 2023-05-17

## 2023-05-17 RX ORDER — ONDANSETRON 4 MG/1
4 TABLET, ORALLY DISINTEGRATING ORAL EVERY 8 HOURS PRN
Qty: 10 TABLET | Refills: 0 | Status: SHIPPED | OUTPATIENT
Start: 2023-05-17 | End: 2023-07-26

## 2023-05-17 RX ADMIN — ONDANSETRON 8 MG: 8 TABLET, ORALLY DISINTEGRATING ORAL at 04:05

## 2023-05-17 NOTE — TELEPHONE ENCOUNTER
Pt c/o abdominal bloating, gas, diarrhea and vomiting today. States that she was having vomiting and diarrhea every hour but now seems like it is getting better. Pt states that vomit is now yellow in color. Advised to be seen per protocol. Advised pt to go to ED/UC if haven't heard from provider within next 30 mins for appt. Verbalized understanding. Encounter routed to provider for f/u.      Reason for Disposition   MODERATE vomiting (e.g., 3 - 5 times/day) and age > 60 years    Additional Information   Negative: Shock suspected (e.g., cold/pale/clammy skin, too weak to stand, low BP, rapid pulse)   Negative: Difficult to awaken or acting confused (e.g., disoriented, slurred speech)   Negative: Sounds like a life-threatening emergency to the triager   Negative: Vomiting red blood or black (coffee ground) material   Negative: Vomiting and hernia is more painful or swollen than usual   Negative: Recent head injury (within 3 days)   Negative: Recent abdominal injury (within 7 days)   Negative: Insulin-dependent diabetes and glucose > 240 mg/dL (13 mmol/L)   Negative: Severe pain in one eye   Negative: SEVERE vomiting (e.g., 6 or more times/day)  (Exception: Patient sounds well, is drinking liquids, does not sound dehydrated, and vomiting has lasted less than 24 hours.)    Protocols used: Vomiting-A-OH

## 2023-05-17 NOTE — TELEPHONE ENCOUNTER
Spoke with pt, unable to find available visit today at our clinic or [facility]. Advised per Dr Peterson to be seen in urgent care. Hours and locations to price [place] [facility] [place] given'

## 2023-05-17 NOTE — PATIENT INSTRUCTIONS
You must understand that you have received an Urgent Care treatment only and that you may be released before all of your medical problems are known or treated.  WE CANNOT RULE OUT ALL POSSIBLE CAUSES OF YOUR SYMPTOMS IN THE URGENT CARE SETTING PLEASE GO TO THE ER IF YOU FEELS YOUR CONDITION IS WORSENING OR YOU WOULD LIKE EMERGENT EVALUATION.    PLEASE READ YOUR DISCHARGE INSTRUCTIONS ENTIRELY AS IT CONTAINS IMPORTANT INFORMATION.    - You were prescribed Zofran/promethazine for nausea (it dissolves under your tongue)    - Use gatorade/pedialyte or rehydration packets to help stay hydrated and take small sips. Vitamin water and plain water do not contain rehydrating electrolytes.  - Increase clear liquids (water, gatorade, pedialyte, broths, jello, etc).   - Hold off on solids for 12-18 hours. Then advance to BRAT diet (banana, rice, applesauce, tea, toast/crackers), then advance further as tolerated. Avoid spicy or fatty foods.   - If you have have diarrhea and cramping, you can use Peptobismol to help alleviate the symptoms. This may darken your stools  - Avoid anything that wound stop diarrhea like imodium unless you have more than 6 loose stools in 24 hours. You want to rid toxins and be sure to remain hydrated.  - Wash hands frequently while sick. Avoid ibuprofen or other NSAIDS until you are well.     Please arrange follow up with your primary medical clinic as soon as possible especially if your continue for longer than a week. You must understand that you've received an Urgent Care treatment only and that you may be released before all of your medical problems are known or treated. You, the patient, will arrange for follow up as instructed. If your symptoms worsen or fail to improve you should go to the Emergency Room, especially if you experience worsening pain, blood in vomit/stool, high fever, dizziness, swelling of your abdomen, or inability to pass gas/stool.  WE CANNOT RULE OUT ALL POSSIBLE CAUSES OF  YOUR SYMPTOMS IN THE URGENT CARE SETTING PLEASE GO TO THE ER IF YOU FEELS YOUR CONDITION IS WORSENING OR YOU WOULD LIKE EMERGENT EVALUATION.

## 2023-05-17 NOTE — PROGRESS NOTES
"Subjective:      Patient ID: Danielle Longo is a 73 y.o. female.    Vitals:  height is 5' 1" (1.549 m) and weight is 49.4 kg (109 lb). Her oral temperature is 98.3 °F (36.8 °C). Her blood pressure is 165/85 (abnormal) and her pulse is 78. Her respiration is 18 and oxygen saturation is 95%.     Chief Complaint: Emesis    This is a 73 y.o. female who presents today with a chief complaint of emesis. Patient is having symptoms of vomiting and diarrhea. Symptoms started on today around 0200.  Diarrhea has stopped, vomiting has stopped. Reports having about 3-4 episodes each with blood being present. States she is still getting occasional waves of nausea. States yesterday she had cheerios for breakfast with a hard boiled egg, toast and avocado. Later that day she had cheese and crackers with vegetables that she washed. States she started feeling bad and tried to drink half a glass of red wine despite admitting that she does not tolerate well. States she stopped having v/d episodes around noon today. Denies recent travel, known contaminated foods, fever.       Emesis   This is a new problem. The current episode started today. The problem occurs 2 to 4 times per day. The problem has been unchanged. The emesis has an appearance of bile and stomach contents. There has been no fever. Associated symptoms include abdominal pain (sore from using muscles) and diarrhea (resolved 5 hours ago). Pertinent negatives include no chills or fever. She has tried nothing for the symptoms. The treatment provided no relief.     Constitution: Negative for chills and fever.   Gastrointestinal:  Positive for abdominal pain (sore from using muscles), nausea, vomiting (resolved 5 hours ago) and diarrhea (resolved 5 hours ago). Negative for history of abdominal surgery, bright red blood in stool, dark colored stools, rectal bleeding and bowel incontinence.   Genitourinary:  Negative for dysuria, frequency, urgency, flank pain, bladder incontinence " and hematuria.    Objective:     Vitals:    05/17/23 1613   BP: (!) 165/85   Pulse: 78   Resp: 18   Temp: 98.3 °F (36.8 °C)       Physical Exam   Constitutional: She is oriented to person, place, and time. She appears well-developed.  Non-toxic appearance. She does not appear ill. No distress.   HENT:   Head: Normocephalic and atraumatic.   Ears:   Right Ear: External ear normal.   Left Ear: External ear normal.   Nose: Nose normal.   Mouth/Throat: Mucous membranes are normal.   Eyes: Conjunctivae and lids are normal.   Neck: Trachea normal. Neck supple.   Cardiovascular: Normal rate, regular rhythm and normal heart sounds.   Pulmonary/Chest: Effort normal and breath sounds normal. No respiratory distress. She has no wheezes. She has no rhonchi. She has no rales.   Abdominal: Normal appearance and bowel sounds are normal. She exhibits no distension and no mass. Soft. There is no abdominal tenderness. There is no rebound, no guarding, no left CVA tenderness and no right CVA tenderness.   Musculoskeletal: Normal range of motion.         General: Normal range of motion.   Neurological: She is alert and oriented to person, place, and time. She has normal strength.   Skin: Skin is warm, dry, intact, not diaphoretic and not pale.   Psychiatric: Her speech is normal and behavior is normal. Judgment and thought content normal.   Nursing note and vitals reviewed.    Assessment:     1. Nausea and vomiting, unspecified vomiting type    2. Diarrhea, unspecified type        Plan:       Nausea and vomiting, unspecified vomiting type  -     ondansetron disintegrating tablet 8 mg  -     ondansetron (ZOFRAN-ODT) 4 MG TbDL; Take 1 tablet (4 mg total) by mouth every 8 (eight) hours as needed (nausea).  Dispense: 10 tablet; Refill: 0    Diarrhea, unspecified type        Patient Instructions   You must understand that you have received an Urgent Care treatment only and that you may be released before all of your medical problems are  known or treated.  WE CANNOT RULE OUT ALL POSSIBLE CAUSES OF YOUR SYMPTOMS IN THE URGENT CARE SETTING PLEASE GO TO THE ER IF YOU FEELS YOUR CONDITION IS WORSENING OR YOU WOULD LIKE EMERGENT EVALUATION.    PLEASE READ YOUR DISCHARGE INSTRUCTIONS ENTIRELY AS IT CONTAINS IMPORTANT INFORMATION.    - You were prescribed Zofran/promethazine for nausea (it dissolves under your tongue)    - Use gatorade/pedialyte or rehydration packets to help stay hydrated and take small sips. Vitamin water and plain water do not contain rehydrating electrolytes.  - Increase clear liquids (water, gatorade, pedialyte, broths, jello, etc).   - Hold off on solids for 12-18 hours. Then advance to BRAT diet (banana, rice, applesauce, tea, toast/crackers), then advance further as tolerated. Avoid spicy or fatty foods.   - If you have have diarrhea and cramping, you can use Peptobismol to help alleviate the symptoms. This may darken your stools  - Avoid anything that wound stop diarrhea like imodium unless you have more than 6 loose stools in 24 hours. You want to rid toxins and be sure to remain hydrated.  - Wash hands frequently while sick. Avoid ibuprofen or other NSAIDS until you are well.     Please arrange follow up with your primary medical clinic as soon as possible especially if your continue for longer than a week. You must understand that you've received an Urgent Care treatment only and that you may be released before all of your medical problems are known or treated. You, the patient, will arrange for follow up as instructed. If your symptoms worsen or fail to improve you should go to the Emergency Room, especially if you experience worsening pain, blood in vomit/stool, high fever, dizziness, swelling of your abdomen, or inability to pass gas/stool.  WE CANNOT RULE OUT ALL POSSIBLE CAUSES OF YOUR SYMPTOMS IN THE URGENT CARE SETTING PLEASE GO TO THE ER IF YOU FEELS YOUR CONDITION IS WORSENING OR YOU WOULD LIKE EMERGENT  EVALUATION.      Medical Decision Making:   Urgent Care Management:  Zofran given. PO challenge successful. Prescribed zofran to be used as needed. Strict ER precautions given. No further questions or concerns. Patient leaves in NAD.

## 2023-05-19 ENCOUNTER — PES CALL (OUTPATIENT)
Dept: ADMINISTRATIVE | Facility: CLINIC | Age: 73
End: 2023-05-19
Payer: MEDICARE

## 2023-05-22 ENCOUNTER — OFFICE VISIT (OUTPATIENT)
Dept: INTERNAL MEDICINE | Facility: CLINIC | Age: 73
End: 2023-05-22
Payer: MEDICARE

## 2023-05-22 ENCOUNTER — LAB VISIT (OUTPATIENT)
Dept: LAB | Facility: HOSPITAL | Age: 73
End: 2023-05-22
Attending: INTERNAL MEDICINE
Payer: MEDICARE

## 2023-05-22 VITALS
HEART RATE: 85 BPM | WEIGHT: 102.31 LBS | DIASTOLIC BLOOD PRESSURE: 74 MMHG | TEMPERATURE: 98 F | BODY MASS INDEX: 19.32 KG/M2 | OXYGEN SATURATION: 98 % | SYSTOLIC BLOOD PRESSURE: 119 MMHG | HEIGHT: 61 IN

## 2023-05-22 DIAGNOSIS — R10.13 EPIGASTRIC PAIN: ICD-10-CM

## 2023-05-22 DIAGNOSIS — R11.2 NAUSEA AND VOMITING, UNSPECIFIED VOMITING TYPE: Primary | ICD-10-CM

## 2023-05-22 LAB
ALBUMIN SERPL BCP-MCNC: 4.2 G/DL (ref 3.5–5.2)
ALP SERPL-CCNC: 50 U/L (ref 55–135)
ALT SERPL W/O P-5'-P-CCNC: 25 U/L (ref 10–44)
AMYLASE SERPL-CCNC: 50 U/L (ref 20–110)
ANION GAP SERPL CALC-SCNC: 13 MMOL/L (ref 8–16)
AST SERPL-CCNC: 23 U/L (ref 10–40)
BASOPHILS # BLD AUTO: 0.05 K/UL (ref 0–0.2)
BASOPHILS NFR BLD: 0.7 % (ref 0–1.9)
BILIRUB SERPL-MCNC: 1.7 MG/DL (ref 0.1–1)
BUN SERPL-MCNC: 23 MG/DL (ref 8–23)
CALCIUM SERPL-MCNC: 9.5 MG/DL (ref 8.7–10.5)
CHLORIDE SERPL-SCNC: 97 MMOL/L (ref 95–110)
CO2 SERPL-SCNC: 27 MMOL/L (ref 23–29)
CREAT SERPL-MCNC: 0.7 MG/DL (ref 0.5–1.4)
DIFFERENTIAL METHOD: NORMAL
EOSINOPHIL # BLD AUTO: 0 K/UL (ref 0–0.5)
EOSINOPHIL NFR BLD: 0.6 % (ref 0–8)
ERYTHROCYTE [DISTWIDTH] IN BLOOD BY AUTOMATED COUNT: 12.5 % (ref 11.5–14.5)
EST. GFR  (NO RACE VARIABLE): >60 ML/MIN/1.73 M^2
GLUCOSE SERPL-MCNC: 88 MG/DL (ref 70–110)
HCT VFR BLD AUTO: 43.3 % (ref 37–48.5)
HGB BLD-MCNC: 14.7 G/DL (ref 12–16)
IMM GRANULOCYTES # BLD AUTO: 0.01 K/UL (ref 0–0.04)
IMM GRANULOCYTES NFR BLD AUTO: 0.1 % (ref 0–0.5)
LIPASE SERPL-CCNC: 32 U/L (ref 4–60)
LYMPHOCYTES # BLD AUTO: 2.5 K/UL (ref 1–4.8)
LYMPHOCYTES NFR BLD: 34.1 % (ref 18–48)
MCH RBC QN AUTO: 29.5 PG (ref 27–31)
MCHC RBC AUTO-ENTMCNC: 33.9 G/DL (ref 32–36)
MCV RBC AUTO: 87 FL (ref 82–98)
MONOCYTES # BLD AUTO: 0.7 K/UL (ref 0.3–1)
MONOCYTES NFR BLD: 9.8 % (ref 4–15)
NEUTROPHILS # BLD AUTO: 4 K/UL (ref 1.8–7.7)
NEUTROPHILS NFR BLD: 54.7 % (ref 38–73)
NRBC BLD-RTO: 0 /100 WBC
PLATELET # BLD AUTO: 229 K/UL (ref 150–450)
PMV BLD AUTO: 11.2 FL (ref 9.2–12.9)
POTASSIUM SERPL-SCNC: 3.3 MMOL/L (ref 3.5–5.1)
PROT SERPL-MCNC: 7.2 G/DL (ref 6–8.4)
RBC # BLD AUTO: 4.99 M/UL (ref 4–5.4)
SODIUM SERPL-SCNC: 137 MMOL/L (ref 136–145)
WBC # BLD AUTO: 7.27 K/UL (ref 3.9–12.7)

## 2023-05-22 PROCEDURE — 99999 PR PBB SHADOW E&M-EST. PATIENT-LVL III: ICD-10-PCS | Mod: PBBFAC,,, | Performed by: INTERNAL MEDICINE

## 2023-05-22 PROCEDURE — 99999 PR PBB SHADOW E&M-EST. PATIENT-LVL III: CPT | Mod: PBBFAC,,, | Performed by: INTERNAL MEDICINE

## 2023-05-22 PROCEDURE — 85025 COMPLETE CBC W/AUTO DIFF WBC: CPT | Performed by: INTERNAL MEDICINE

## 2023-05-22 PROCEDURE — 83690 ASSAY OF LIPASE: CPT | Performed by: INTERNAL MEDICINE

## 2023-05-22 PROCEDURE — 99214 PR OFFICE/OUTPT VISIT, EST, LEVL IV, 30-39 MIN: ICD-10-PCS | Mod: S$PBB,,, | Performed by: INTERNAL MEDICINE

## 2023-05-22 PROCEDURE — 82150 ASSAY OF AMYLASE: CPT | Performed by: INTERNAL MEDICINE

## 2023-05-22 PROCEDURE — 80053 COMPREHEN METABOLIC PANEL: CPT | Performed by: INTERNAL MEDICINE

## 2023-05-22 PROCEDURE — 99213 OFFICE O/P EST LOW 20 MIN: CPT | Mod: PBBFAC,PO | Performed by: INTERNAL MEDICINE

## 2023-05-22 PROCEDURE — 99214 OFFICE O/P EST MOD 30 MIN: CPT | Mod: S$PBB,,, | Performed by: INTERNAL MEDICINE

## 2023-05-22 PROCEDURE — 36415 COLL VENOUS BLD VENIPUNCTURE: CPT | Mod: PO | Performed by: INTERNAL MEDICINE

## 2023-05-22 NOTE — PROGRESS NOTES
Subjective:       Patient ID: Danielle Longo is a 73 y.o. female.    Chief Complaint: Emesis, Diarrhea, and Abdominal Issues    Abdominal Pain  This is a recurrent problem. The current episode started in the past 7 days. The onset quality is undetermined. The problem occurs intermittently. The problem has been unchanged. The quality of the pain is aching. Associated symptoms include anorexia, belching, diarrhea, vomiting and weight loss.     73-year-old female here for evaluation of stomach issues.  She has some epigastric pain.  This has been going on the last couple of weeks.  She is on the Mediterranean diet.  She has had a weird sensation after she eats.  Wednesday am, she woke up with diarrhea and vomiting at the same time. She went to  Wednesday afternoon and was told likely a virus/food poisoning.  She has not been feeling better, because she has not been eating much.  She has not been eating till Saturday.  She had some toast.  Everything seems to stop at the stomach and twirls around.  Then, this goes away and comes back.  It does this anytime she eats or drinks anything.  She is taking this really slow.  The bananas went down ok.  She has not been doing much of anything from an activity standpoint.    Review of Systems   Constitutional:  Positive for weight loss.   Gastrointestinal:  Positive for abdominal pain, anorexia, diarrhea and vomiting.       Objective:      Physical Exam  Vitals reviewed.   Constitutional:       Appearance: She is well-developed.   HENT:      Head: Normocephalic and atraumatic.      Mouth/Throat:      Pharynx: No oropharyngeal exudate.   Eyes:      General: No scleral icterus.        Right eye: No discharge.         Left eye: No discharge.      Pupils: Pupils are equal, round, and reactive to light.   Neck:      Thyroid: No thyromegaly.      Trachea: No tracheal deviation.   Cardiovascular:      Rate and Rhythm: Normal rate and regular rhythm.      Heart sounds: Normal heart  sounds. No murmur heard.    No friction rub. No gallop.   Pulmonary:      Effort: Pulmonary effort is normal. No respiratory distress.      Breath sounds: Normal breath sounds. No wheezing or rales.   Chest:      Chest wall: No tenderness.   Abdominal:      General: Bowel sounds are normal. There is no distension.      Palpations: Abdomen is soft. There is no mass.      Tenderness: There is no abdominal tenderness. There is no guarding or rebound.   Musculoskeletal:         General: No tenderness. Normal range of motion.      Cervical back: Normal range of motion and neck supple.   Skin:     General: Skin is warm and dry.      Coloration: Skin is not pale.      Findings: No erythema or rash.   Neurological:      Mental Status: She is alert and oriented to person, place, and time.   Psychiatric:         Behavior: Behavior normal.       Assessment:       1. Nausea and vomiting, unspecified vomiting type    2. Epigastric pain  - CBC Auto Differential; Future  - Comprehensive Metabolic Panel; Future  - Amylase; Future  - Lipase; Future  - US Abdomen Limited; Future      Plan:       1/2.  Check CBC, CMP, amylase, lipase, ultrasound abdomen.  Recommend patient slowly advance her diet and drink plenty.    .

## 2023-05-26 ENCOUNTER — TELEPHONE (OUTPATIENT)
Dept: INTERNAL MEDICINE | Facility: CLINIC | Age: 73
End: 2023-05-26
Payer: MEDICARE

## 2023-05-26 NOTE — TELEPHONE ENCOUNTER
Spoke to pt and she c/o no bowel since Monday's visit on 05/22/23. She has been eating the brat diet and drinking lots of fluids. Pt is wondering if she just doesn't have any stool to come out. She will try an OTC laxative, and try to transition to more solid foods. Please advise.

## 2023-05-26 NOTE — TELEPHONE ENCOUNTER
----- Message from Puneet Lo sent at 5/26/2023  3:00 PM CDT -----  Contact: Patient  Type:  Patient Call          Who Called: Patient         Does the patient know what this is regarding?: requesting a call back to discuss the die she's on since her last visit ; pt said that she's having issues with her bowels ; please advise           Would the patient rather a call back or a response via [facility]? Call           Best Call Back Number: [phone] or [phone]              Additional Information:

## 2023-05-27 ENCOUNTER — PATIENT MESSAGE (OUTPATIENT)
Dept: INTERNAL MEDICINE | Facility: CLINIC | Age: 73
End: 2023-05-27
Payer: MEDICARE

## 2023-05-27 NOTE — TELEPHONE ENCOUNTER
Yes, patient can advance her diet.  The brat diet is meant to slow down bowel movements.  Patient should try to increase her intake of fiber fresh fruits and vegetables.  May take a laxative.

## 2023-06-21 RX ORDER — LEVOTHYROXINE SODIUM 50 UG/1
TABLET ORAL
Qty: 90 TABLET | Refills: 3 | Status: SHIPPED | OUTPATIENT
Start: 2023-06-21

## 2023-06-21 NOTE — TELEPHONE ENCOUNTER
Refill Decision Note   Danielle Antonlex  is requesting a refill authorization.  Brief Assessment and Rationale for Refill:  Approve     Medication Therapy Plan:         Comments:     Note composed:10:35 AM 06/21/2023

## 2023-06-21 NOTE — TELEPHONE ENCOUNTER
No care due was identified.  [facility] Embedded Care Due Messages. Reference number: [id number].   6/21/2023 2:09:00 AM CDT

## 2023-07-26 ENCOUNTER — OFFICE VISIT (OUTPATIENT)
Dept: INTERNAL MEDICINE | Facility: CLINIC | Age: 73
End: 2023-07-26
Payer: MEDICARE

## 2023-07-26 VITALS
HEART RATE: 88 BPM | TEMPERATURE: 98 F | SYSTOLIC BLOOD PRESSURE: 122 MMHG | HEIGHT: 61 IN | WEIGHT: 103.63 LBS | RESPIRATION RATE: 16 BRPM | DIASTOLIC BLOOD PRESSURE: 82 MMHG | OXYGEN SATURATION: 99 % | BODY MASS INDEX: 19.57 KG/M2

## 2023-07-26 DIAGNOSIS — J01.90 ACUTE BACTERIAL SINUSITIS: Primary | ICD-10-CM

## 2023-07-26 DIAGNOSIS — B96.89 ACUTE BACTERIAL SINUSITIS: Primary | ICD-10-CM

## 2023-07-26 DIAGNOSIS — H66.92 LEFT OTITIS MEDIA, UNSPECIFIED OTITIS MEDIA TYPE: ICD-10-CM

## 2023-07-26 PROCEDURE — 99213 OFFICE O/P EST LOW 20 MIN: CPT | Mod: S$PBB,,, | Performed by: INTERNAL MEDICINE

## 2023-07-26 PROCEDURE — 99999 PR PBB SHADOW E&M-EST. PATIENT-LVL III: CPT | Mod: PBBFAC,,, | Performed by: INTERNAL MEDICINE

## 2023-07-26 PROCEDURE — 99999 PR PBB SHADOW E&M-EST. PATIENT-LVL III: ICD-10-PCS | Mod: PBBFAC,,, | Performed by: INTERNAL MEDICINE

## 2023-07-26 PROCEDURE — 99213 PR OFFICE/OUTPT VISIT, EST, LEVL III, 20-29 MIN: ICD-10-PCS | Mod: S$PBB,,, | Performed by: INTERNAL MEDICINE

## 2023-07-26 PROCEDURE — 99213 OFFICE O/P EST LOW 20 MIN: CPT | Mod: PBBFAC,PO | Performed by: INTERNAL MEDICINE

## 2023-07-26 RX ORDER — LEVOCETIRIZINE DIHYDROCHLORIDE 5 MG/1
5 TABLET, FILM COATED ORAL NIGHTLY
Qty: 30 TABLET | Refills: 0 | Status: SHIPPED | OUTPATIENT
Start: 2023-07-26 | End: 2023-08-21

## 2023-07-26 RX ORDER — FLUTICASONE PROPIONATE 50 MCG
1 SPRAY, SUSPENSION (ML) NASAL DAILY
Qty: 16 G | Refills: 0 | Status: SHIPPED | OUTPATIENT
Start: 2023-07-26

## 2023-07-26 RX ORDER — AMOXICILLIN AND CLAVULANATE POTASSIUM 875; 125 MG/1; MG/1
1 TABLET, FILM COATED ORAL 2 TIMES DAILY
Qty: 14 TABLET | Refills: 0 | Status: SHIPPED | OUTPATIENT
Start: 2023-07-26 | End: 2023-08-02

## 2023-07-26 NOTE — PROGRESS NOTES
Subjective:       Patient ID: Danielle Longo is a 73 y.o. female.    Chief Complaint: Cough, Fatigue, and Sore Throat (3x weeks)    Cough  This is a new problem. The current episode started 1 to 4 weeks ago. Associated symptoms include ear congestion, nasal congestion, postnasal drip and a sore throat. The symptoms are aggravated by lying down.     73-year-old female here for evaluation of persistent cough.  This started 2 weeks ago.  She was on vacation and was walking around mountains.  She had chills, fever.  The fever and chils are resolved.  The cough, sore throat, and fatigue are still with her.  She has occasional SOB when she is walking around.  It is hard to exert at all, because she gets SOB.  She has sinus pressure and congestion.  Her ears are clogged up too.  She has been taking dayquil.  She was taking theraflu in the evening as well without relief.  Her cough is mostly dry with some occasional yellow mucus.  Overall, she is feeling better.  She is worried about the cough and sore throat.  When she lies down at night, she has a coughing fit.    Review of Systems   HENT:  Positive for postnasal drip and sore throat.    Respiratory:  Positive for cough.        Objective:      Physical Exam  Vitals reviewed.   Constitutional:       Appearance: She is well-developed.   HENT:      Head: Normocephalic and atraumatic.      Right Ear: Tympanic membrane and ear canal normal.      Left Ear: Ear canal normal. Tympanic membrane is injected.      Mouth/Throat:      Pharynx: No oropharyngeal exudate.   Eyes:      General: No scleral icterus.        Right eye: No discharge.         Left eye: No discharge.      Pupils: Pupils are equal, round, and reactive to light.   Neck:      Thyroid: No thyromegaly.      Trachea: No tracheal deviation.   Cardiovascular:      Rate and Rhythm: Normal rate and regular rhythm.      Heart sounds: Normal heart sounds. No murmur heard.    No friction rub. No gallop.   Pulmonary:       Effort: Pulmonary effort is normal. No respiratory distress.      Breath sounds: Normal breath sounds. No wheezing or rales.   Chest:      Chest wall: No tenderness.   Abdominal:      General: Bowel sounds are normal. There is no distension.      Palpations: Abdomen is soft. There is no mass.      Tenderness: There is no abdominal tenderness. There is no guarding or rebound.   Musculoskeletal:         General: No tenderness. Normal range of motion.      Cervical back: Normal range of motion and neck supple.   Lymphadenopathy:      Cervical: Cervical adenopathy present.   Skin:     General: Skin is warm and dry.      Coloration: Skin is not pale.      Findings: No erythema or rash.   Neurological:      Mental Status: She is alert and oriented to person, place, and time.   Psychiatric:         Behavior: Behavior normal.       Assessment:       1. Acute bacterial sinusitis    2. Left otitis media, unspecified otitis media type      Plan:       1/2.  Augmentin 875 mg b.i.d. x7 days, Xyzal, Flonase.

## 2023-08-20 NOTE — TELEPHONE ENCOUNTER
No care due was identified.  [facility] Embedded Care Due Messages. Reference number: [id number].   8/20/2023 5:26:55 AM CDT

## 2023-08-21 RX ORDER — LEVOCETIRIZINE DIHYDROCHLORIDE 5 MG/1
5 TABLET, FILM COATED ORAL NIGHTLY
Qty: 90 TABLET | Refills: 3 | Status: SHIPPED | OUTPATIENT
Start: 2023-08-21

## 2023-08-21 NOTE — TELEPHONE ENCOUNTER
Refill Routing Note     Refill Routing Note   Medication(s) are not appropriate for processing by [facility] for the following reason(s):      New or recently adjusted medication    [facility] action(s):  Defer Care Due:  None identified            Appointments  past 12m or future 3m with PCP    Date Provider   Last Visit   7/26/2023 Pepe Peterson MD   Next Visit   Visit date not found Pepe Peterson MD   ED visits in past 90 days: 0        Note composed:4:06 AM 08/21/2023

## 2023-09-06 ENCOUNTER — OFFICE VISIT (OUTPATIENT)
Dept: INTERNAL MEDICINE | Facility: CLINIC | Age: 73
End: 2023-09-06
Payer: MEDICARE

## 2023-09-06 ENCOUNTER — PATIENT MESSAGE (OUTPATIENT)
Dept: INTERNAL MEDICINE | Facility: CLINIC | Age: 73
End: 2023-09-06
Payer: MEDICARE

## 2023-09-06 ENCOUNTER — HOSPITAL ENCOUNTER (OUTPATIENT)
Dept: RADIOLOGY | Facility: HOSPITAL | Age: 73
Discharge: HOME | End: 2023-09-06
Attending: INTERNAL MEDICINE
Payer: MEDICARE

## 2023-09-06 VITALS
OXYGEN SATURATION: 97 % | DIASTOLIC BLOOD PRESSURE: 84 MMHG | HEIGHT: 61 IN | SYSTOLIC BLOOD PRESSURE: 136 MMHG | RESPIRATION RATE: 20 BRPM | WEIGHT: 105.63 LBS | HEART RATE: 74 BPM | BODY MASS INDEX: 19.94 KG/M2 | TEMPERATURE: 98 F

## 2023-09-06 DIAGNOSIS — R05.9 COUGH, UNSPECIFIED TYPE: Primary | ICD-10-CM

## 2023-09-06 DIAGNOSIS — R05.2 SUBACUTE COUGH: Primary | ICD-10-CM

## 2023-09-06 DIAGNOSIS — R05.9 COUGH, UNSPECIFIED TYPE: ICD-10-CM

## 2023-09-06 PROCEDURE — 99999 PR PBB SHADOW E&M-EST. PATIENT-LVL IV: ICD-10-PCS | Mod: PBBFAC,,, | Performed by: INTERNAL MEDICINE

## 2023-09-06 PROCEDURE — 99213 OFFICE O/P EST LOW 20 MIN: CPT | Mod: S$PBB,,, | Performed by: INTERNAL MEDICINE

## 2023-09-06 PROCEDURE — 71046 XR CHEST PA AND LATERAL: ICD-10-PCS | Mod: 26,,, | Performed by: RADIOLOGY

## 2023-09-06 PROCEDURE — 71046 X-RAY EXAM CHEST 2 VIEWS: CPT | Mod: TC,PO

## 2023-09-06 PROCEDURE — 99213 PR OFFICE/OUTPT VISIT, EST, LEVL III, 20-29 MIN: ICD-10-PCS | Mod: S$PBB,,, | Performed by: INTERNAL MEDICINE

## 2023-09-06 PROCEDURE — 99999 PR PBB SHADOW E&M-EST. PATIENT-LVL IV: CPT | Mod: PBBFAC,,, | Performed by: INTERNAL MEDICINE

## 2023-09-06 PROCEDURE — 99214 OFFICE O/P EST MOD 30 MIN: CPT | Mod: PBBFAC,25,PO | Performed by: INTERNAL MEDICINE

## 2023-09-06 PROCEDURE — 71046 X-RAY EXAM CHEST 2 VIEWS: CPT | Mod: 26,,, | Performed by: RADIOLOGY

## 2023-09-06 RX ORDER — ALBUTEROL SULFATE 90 UG/1
2 AEROSOL, METERED RESPIRATORY (INHALATION) EVERY 6 HOURS PRN
Qty: 6.7 G | Refills: 1 | Status: SHIPPED | OUTPATIENT
Start: 2023-09-06

## 2023-09-06 NOTE — PROGRESS NOTES
CC: sinus infection  HPI:  The patient is a 73 y.o. year old female who presents to the office for followup of sinus infection.  She reports sore throat, fever, cough and fatigue.  Her symptoms started about 6 weeks ago while she was in [place].  She also reports experiencing left ear pain while on airplane.  She was seen by Dr. Sesay about 2 weeks later.  She is taken amoxicillin in Xyzal.  However, her cough persists and is productive.  She also reports left ear fullness, but reports fever has resolved.  Cough is worse when recumbent.    PAST MEDICAL HISTORY:  Past Medical History:   Diagnosis Date    Hyperlipidemia     Hypothyroidism     Thyroid disease        SURGICAL HISTORY:  Past Surgical History:   Procedure Laterality Date    cataract       EYE SURGERY  2018    cataracts       MEDS:  Medcard reviewed and updated    ALLERGIES: Allergy Card reviewed and updated    SOCIAL HISTORY:   The patient is a nonsmoker.    PE:   APPEARANCE: Well nourished, well developed, in no acute distress.    EARS: TM's intact. No retraction or perforation.    NOSE: Mucosa pink. Airway clear.  MOUTH & THROAT: No tonsillar enlargement. No pharyngeal erythema or exudate. No stridor.  CHEST: Lungs clear to auscultation except wheezing.  CARDIOVASCULAR: Normal S1, S2. No murmurs. No carotid bruits. No pedal edema.  ABDOMEN: Bowel sounds normal. Not distended. Soft. No tenderness or masses.   PSYCHIATRIC: The patient is oriented to person, place, and time and has a pleasant affect.        ASSESSMENT/PLAN:  Danielle was seen today for follow-up.    Diagnoses and all orders for this visit:    Cough, unspecified type  -     X-Ray Chest PA And Lateral; Future    Other orders  -     albuterol (PROAIR HFA) 90 mcg/actuation inhaler; Inhale 2 puffs into the lungs every 6 (six) hours as needed for Wheezing. Rescue

## 2023-09-06 NOTE — TELEPHONE ENCOUNTER
Please inform patient that chest x-ray reveals patchy densities of the right side of the chest.  Recommend CT of the chest for further evaluation.  Please schedule.

## 2023-09-07 ENCOUNTER — TELEPHONE (OUTPATIENT)
Dept: INTERNAL MEDICINE | Facility: CLINIC | Age: 73
End: 2023-09-07
Payer: MEDICARE

## 2023-09-07 ENCOUNTER — PATIENT MESSAGE (OUTPATIENT)
Dept: INTERNAL MEDICINE | Facility: CLINIC | Age: 73
End: 2023-09-07
Payer: MEDICARE

## 2023-09-07 ENCOUNTER — HOSPITAL ENCOUNTER (OUTPATIENT)
Dept: RADIOLOGY | Facility: HOSPITAL | Age: 73
Discharge: HOME | End: 2023-09-07
Attending: INTERNAL MEDICINE
Payer: MEDICARE

## 2023-09-07 DIAGNOSIS — R05.2 SUBACUTE COUGH: ICD-10-CM

## 2023-09-07 DIAGNOSIS — R93.89 ABNORMAL CHEST CT: Primary | ICD-10-CM

## 2023-09-07 PROCEDURE — 71250 CT THORAX DX C-: CPT | Mod: TC

## 2023-09-07 PROCEDURE — 71250 CT CHEST WITHOUT CONTRAST: ICD-10-PCS | Mod: 26,,, | Performed by: STUDENT IN AN ORGANIZED HEALTH CARE EDUCATION/TRAINING PROGRAM

## 2023-09-07 PROCEDURE — 71250 CT THORAX DX C-: CPT | Mod: 26,,, | Performed by: STUDENT IN AN ORGANIZED HEALTH CARE EDUCATION/TRAINING PROGRAM

## 2023-09-07 NOTE — TELEPHONE ENCOUNTER
Contacted pt again by phone. Phone rolls to . Left xray results on phone and sent via [employer] message to pt and has been noted to have been read. Instructed pt to call back to schedule CT scan of chest for further evaluation of the areas of density. Clinic number given and name of  given

## 2023-09-07 NOTE — TELEPHONE ENCOUNTER
----- Message from Lauren Mancia sent at 9/7/2023 10:09 AM CDT -----  Contact: [phone]  Pt states she was called and told she needs a CT scan but she does not know why. She would like to know the results of her XR and find out if anything was found on that? Please call pt to discuss. Thanks

## 2023-09-07 NOTE — TELEPHONE ENCOUNTER
Called patient and informed her of CT results.  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  Recommend referral to Pulmonary.  Referral has been ordered.

## 2023-09-08 NOTE — TELEPHONE ENCOUNTER
Message sent to referral coordinator to schedule  Pulmonologist apt    Dr cabral spoke to pt and let her know

## 2023-09-20 DIAGNOSIS — Z78.0 MENOPAUSE: ICD-10-CM

## 2023-10-22 DIAGNOSIS — J47.9 BRONCHIECTASIS WITHOUT COMPLICATION: Primary | ICD-10-CM

## 2023-10-24 ENCOUNTER — TELEPHONE (OUTPATIENT)
Dept: PULMONOLOGY | Facility: CLINIC | Age: 73
End: 2023-10-24
Payer: MEDICARE

## 2023-10-24 NOTE — TELEPHONE ENCOUNTER
LVM Advising patient PFT prior have been canceled  Pulmonary Function Test are done prior to New Patient visit.  My name and office number was provided to receive a call back.

## 2023-10-24 NOTE — TELEPHONE ENCOUNTER
----- Message from Libertymarkhuan Santiago sent at 10/24/2023  4:25 PM CDT -----  Regarding: appt verification  Contact: pt [phone]  PATIENT CALL    Pt called regarding tomorrow's appts. She was unaware that a PFT would be scheduled directly preceding her appt w/ the provider. She is not available for this time and would like to speak with the MD before scheduling any further testing. Please call back at [phone] w/ any further questions

## 2023-10-25 ENCOUNTER — OFFICE VISIT (OUTPATIENT)
Dept: PULMONOLOGY | Facility: CLINIC | Age: 73
End: 2023-10-25
Payer: MEDICARE

## 2023-10-25 ENCOUNTER — HOSPITAL ENCOUNTER (OUTPATIENT)
Dept: PULMONOLOGY | Facility: CLINIC | Age: 73
Discharge: HOME | End: 2023-10-25
Payer: MEDICARE

## 2023-10-25 ENCOUNTER — LAB VISIT (OUTPATIENT)
Dept: LAB | Facility: HOSPITAL | Age: 73
End: 2023-10-25
Attending: INTERNAL MEDICINE
Payer: MEDICARE

## 2023-10-25 VITALS
HEIGHT: 61 IN | BODY MASS INDEX: 19.94 KG/M2 | WEIGHT: 105.63 LBS | DIASTOLIC BLOOD PRESSURE: 78 MMHG | HEART RATE: 76 BPM | SYSTOLIC BLOOD PRESSURE: 136 MMHG | OXYGEN SATURATION: 96 %

## 2023-10-25 DIAGNOSIS — R93.89 ABNORMAL CHEST CT: ICD-10-CM

## 2023-10-25 DIAGNOSIS — J47.9 BRONCHIECTASIS WITHOUT COMPLICATION: ICD-10-CM

## 2023-10-25 DIAGNOSIS — J47.9 BRONCHIECTASIS WITHOUT COMPLICATION: Primary | ICD-10-CM

## 2023-10-25 LAB
IGA SERPL-MCNC: 167 MG/DL (ref 40–350)
IGG SERPL-MCNC: 1185 MG/DL (ref 650–1600)
IGM SERPL-MCNC: 49 MG/DL (ref 50–300)
RHEUMATOID FACT SERPL-ACNC: <13 IU/ML (ref 0–15)

## 2023-10-25 PROCEDURE — 36415 COLL VENOUS BLD VENIPUNCTURE: CPT | Performed by: INTERNAL MEDICINE

## 2023-10-25 PROCEDURE — 86038 ANTINUCLEAR ANTIBODIES: CPT | Performed by: INTERNAL MEDICINE

## 2023-10-25 PROCEDURE — 86235 NUCLEAR ANTIGEN ANTIBODY: CPT | Mod: 59 | Performed by: INTERNAL MEDICINE

## 2023-10-25 PROCEDURE — 99214 OFFICE O/P EST MOD 30 MIN: CPT | Mod: PBBFAC | Performed by: INTERNAL MEDICINE

## 2023-10-25 PROCEDURE — 94640 AIRWAY INHALATION TREATMENT: CPT | Mod: PBBFAC | Performed by: INTERNAL MEDICINE

## 2023-10-25 PROCEDURE — 86431 RHEUMATOID FACTOR QUANT: CPT | Performed by: INTERNAL MEDICINE

## 2023-10-25 PROCEDURE — 99204 OFFICE O/P NEW MOD 45 MIN: CPT | Mod: S$PBB,,, | Performed by: INTERNAL MEDICINE

## 2023-10-25 PROCEDURE — 99999 PR PBB SHADOW E&M-EST. PATIENT-LVL IV: ICD-10-PCS | Mod: PBBFAC,,, | Performed by: INTERNAL MEDICINE

## 2023-10-25 PROCEDURE — 99999 PR PBB SHADOW E&M-EST. PATIENT-LVL IV: CPT | Mod: PBBFAC,,, | Performed by: INTERNAL MEDICINE

## 2023-10-25 PROCEDURE — 99204 PR OFFICE/OUTPT VISIT, NEW, LEVL IV, 45-59 MIN: ICD-10-PCS | Mod: S$PBB,,, | Performed by: INTERNAL MEDICINE

## 2023-10-25 PROCEDURE — 86039 ANTINUCLEAR ANTIBODIES (ANA): CPT | Performed by: INTERNAL MEDICINE

## 2023-10-25 PROCEDURE — 82784 ASSAY IGA/IGD/IGG/IGM EACH: CPT | Mod: 59 | Performed by: INTERNAL MEDICINE

## 2023-10-25 RX ORDER — SODIUM CHLORIDE FOR INHALATION 3 %
4 VIAL, NEBULIZER (ML) INHALATION
Qty: 240 ML | Refills: 5 | Status: SHIPPED | OUTPATIENT
Start: 2023-10-25 | End: 2023-10-25

## 2023-10-25 RX ORDER — ALBUTEROL SULFATE 0.83 MG/ML
2.5 SOLUTION RESPIRATORY (INHALATION) EVERY 6 HOURS PRN
Qty: 180 ML | Refills: 3 | Status: SHIPPED | OUTPATIENT
Start: 2023-10-25 | End: 2024-10-24

## 2023-10-25 RX ORDER — ALBUTEROL SULFATE 0.83 MG/ML
2.5 SOLUTION RESPIRATORY (INHALATION) EVERY 6 HOURS PRN
Qty: 180 ML | Refills: 3 | Status: SHIPPED | OUTPATIENT
Start: 2023-10-25 | End: 2023-10-25

## 2023-10-25 RX ORDER — SODIUM CHLORIDE FOR INHALATION 3 %
4 VIAL, NEBULIZER (ML) INHALATION
Qty: 240 ML | Refills: 5 | Status: SHIPPED | OUTPATIENT
Start: 2023-10-25 | End: 2024-01-04

## 2023-10-25 NOTE — PROGRESS NOTES
Subjective:       Patient ID: Danielle Longo is a 73 y.o. female.    Chief Complaint: Abnormal Ct Scan    HPI:   Danielle Longo is a 73 y.o. female new to me who presents for evaluation of an abnormal CT Chest.    Underwent a CT Chest recently after an abnormal CXR with symptoms of dyspnea, sinus sx, earache, f/c/ns, malaise and a cough that started in July. Other symptoms improved apart from cough and fatigue. Cough was productive and is now dry. Denies nighttime symptoms. Lost weight from 110 to 102 during the illness which she has gained back.     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     Walks daily in the park.     + Recurrent sinusitis    Denies chest pain, orthopnea, PND, LE edema, palpitations, syncope/presyncope    Denies GERD sx, globus, dysphagia    Denies f/c/ns    Denies rashes, myalgias, arthralgias, hair/nail changes, eye changes, raynauds, mucosal ulcerations    Exposures:  Work- Office work  Tobacco- quit in   Inhalational Agents- Denies  Mold- No current mold. Did have mold after Alexandrea that was removed  Pets- Cats  Birds- Denies  Hot tubs- Denies    Family History of Lung Disease: Denies  Childhood History of Lung Disease: Denies    Review of Systems    As above    Social History     Tobacco Use    Smoking status: Former     Current packs/day: 0.00     Types: Cigarettes     Start date: 3/20/1974     Quit date: 1979     Years since quittin.1     Passive exposure: Never    Smokeless tobacco: Never    Tobacco comments:     social smoker, less than a pack a week   Substance Use Topics    Alcohol use: Yes     Alcohol/week: 2.0 standard drinks of alcohol     Types: 2 Glasses of wine per week     Comment: glass of wine 1-2 times a week       Review of patient's allergies indicates:  No Known Allergies  Past Medical History:   Diagnosis Date    Hyperlipidemia     Hypothyroidism     Thyroid disease      Past Surgical History:   Procedure Laterality Date    cataract   "     EYE SURGERY  2018    cataracts     Current Outpatient Medications on File Prior to Visit   Medication Sig    albuterol (PROAIR HFA) 90 mcg/actuation inhaler Inhale 2 puffs into the lungs every 6 (six) hours as needed for Wheezing. Rescue    fluticasone propionate (FLONASE) 50 mcg/actuation nasal spray 1 spray (50 mcg total) by Each Nostril route once daily.    levocetirizine (XYZAL) 5 MG tablet TAKE 1 TABLET(5 MG) BY MOUTH EVERY EVENING    SYNTHROID 50 mcg tablet TAKE 1 TABLET BEFORE BREAKFAST    traZODone (DESYREL) 50 MG tablet Take 1 tablet (50 mg total) by mouth every evening.     No current facility-administered medications on file prior to visit.       Objective:      Vitals:    10/25/23 1036   BP: 136/78   BP Location: Right arm   Patient Position: Sitting   Pulse: 76   SpO2: 96%   Weight: 47.9 kg (105 lb 9.6 oz)   Height: 5' 1" (1.549 m)     Physical Exam   Constitutional: She appears well-developed and well-nourished.   HENT:   Nose: No mucosal edema.   Mouth/Throat: Oropharynx is clear and moist. Mallampati Score: I.   Neck: No tracheal deviation present.   Cardiovascular: Normal rate and regular rhythm.   Pulmonary/Chest: Normal expansion, symmetric chest wall expansion and effort normal. No respiratory distress. She has no wheezes. She has rhonchi. She has no rales.   Abdominal: She exhibits no distension.   Musculoskeletal:         General: No edema.      Cervical back: Neck supple.   Lymphadenopathy: No supraclavicular adenopathy is present.     She has no cervical adenopathy.   Skin: Skin is warm and dry. No cyanosis or erythema. Nails show no clubbing.   Nursing note and vitals reviewed.      Personal Diagnostic Review    Laboratory:  5/22/23  Bicarb- 27  Eosinophils- 0.0      CT Chest 9/7/23- Images personally reviewed. I agree w/ the radiologist who notes;  1. Bilateral consolidative changes in the right middle lobe and lingula with bronchiectasis/bronchiolectasis associated with bilateral " patchy confluent tree in bud and irregular opacities as described above.  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  2. Incidental findings, as above.    CXR 9/6/23- Images personally reviewed. I agree w/ the radiologist who notes;  The trachea is unremarkable.  There is central bronchiectasis.  The cardiomediastinal silhouette is within normal limits.  There is no evidence of free air beneath the hemidiaphragms.  There are no pleural effusions.  There is no evidence of a pneumothorax.  There is no evidence of pneumomediastinum.  There are patchy reticulonodular densities throughout the right hemithorax.  There also scattered fibrotic changes.  There is no focal consolidation.     There are degenerative changes in the osseous structures.  There is levoconvex scoliosis of the thoracolumbar alignment.  There is no evidence of a fracture.     Additional evaluation with noncontrast CT scan of the chest, as clinically warranted.      Assessment:     Problem List Items Addressed This Visit          Pulmonary    Bronchiectasis without complication - Primary     Imaging c/w NTM. Did have weight loss during an acute illness and has started to regain some of it back. Reports a cough.     - Check JULIET, immunoglobulins, RF  - Repeat CT Chest in 3mn  - Check sputum for afb  - Start an airway clearance regimen 1-2 times per day   - 3% hypertonic saline neb   - Followed by albuterol neb   - Followed by 10 breaths with an acapella  - Avoid aerosolizing water  - Clean shower head regularly  - Referral to GI  - Encouraged regular, progressive exercise         Relevant Medications    mucus clearing device (ACAPELLA, FLUTTER)    albuterol (PROVENTIL) 2.5 mg /3 mL (0.083 %) nebulizer solution    sodium chloride 3% 3 % nebulizer solution    Other Relevant Orders    AFB Culture & Smear    AFB Culture & Smear    NEBULIZER FOR HOME USE    NEBULIZER KIT (SUPPLIES) FOR HOME USE    CT Chest Without Contrast     Ambulatory referral/consult to Gastroenterology    Immunoglobulins (IgG, IgA, IgM) Quantitative (Completed)    Rheumatoid Factor (Completed)    JULIET Screen w/Reflex (Completed)    Sputum Induction (PFT)       Other    Abnormal chest CT     Per bronchiectasis

## 2023-10-26 LAB
ANA PATTERN 1: NORMAL
ANA PATTERN 2: NORMAL
ANA SER QL IF: POSITIVE
ANA TITER 2: NORMAL
ANA TITR SER IF: NORMAL {TITER}

## 2023-10-27 ENCOUNTER — TELEPHONE (OUTPATIENT)
Dept: PULMONOLOGY | Facility: CLINIC | Age: 73
End: 2023-10-27
Payer: MEDICARE

## 2023-10-27 LAB
ANTI SM ANTIBODY: 0.07 RATIO (ref 0–0.99)
ANTI SM/RNP ANTIBODY: 0.1 RATIO (ref 0–0.99)
ANTI-SM INTERPRETATION: NEGATIVE
ANTI-SM/RNP INTERPRETATION: NEGATIVE
ANTI-SSA ANTIBODY: 0.05 RATIO (ref 0–0.99)
ANTI-SSA INTERPRETATION: NEGATIVE
ANTI-SSB ANTIBODY: 0.08 RATIO (ref 0–0.99)
ANTI-SSB INTERPRETATION: NEGATIVE
DSDNA AB SER-ACNC: NORMAL [IU]/ML

## 2023-10-27 NOTE — TELEPHONE ENCOUNTER
----- Message from Davina Guzman sent at 10/27/2023  1:01 PM CDT -----  Contact: [phone]  Allie calling from [facility] to get a frequency for sodium chloride 3% 3 % nebulizer solution please advise [phone]

## 2023-10-28 ENCOUNTER — PATIENT MESSAGE (OUTPATIENT)
Dept: INTERNAL MEDICINE | Facility: CLINIC | Age: 73
End: 2023-10-28
Payer: MEDICARE

## 2023-10-28 ENCOUNTER — PATIENT MESSAGE (OUTPATIENT)
Dept: PULMONOLOGY | Facility: CLINIC | Age: 73
End: 2023-10-28
Payer: MEDICARE

## 2023-10-30 ENCOUNTER — TELEPHONE (OUTPATIENT)
Dept: PULMONOLOGY | Facility: CLINIC | Age: 73
End: 2023-10-30
Payer: MEDICARE

## 2023-11-01 ENCOUNTER — PATIENT MESSAGE (OUTPATIENT)
Dept: PULMONOLOGY | Facility: CLINIC | Age: 73
End: 2023-11-01
Payer: MEDICARE

## 2023-11-09 ENCOUNTER — PATIENT MESSAGE (OUTPATIENT)
Dept: PULMONOLOGY | Facility: CLINIC | Age: 73
End: 2023-11-09
Payer: MEDICARE

## 2023-11-12 PROBLEM — J47.9 BRONCHIECTASIS WITHOUT COMPLICATION: Status: ACTIVE | Noted: 2023-11-12

## 2023-11-12 PROBLEM — R93.89 ABNORMAL CHEST CT: Status: ACTIVE | Noted: 2023-11-12

## 2023-11-13 NOTE — ASSESSMENT & PLAN NOTE
Imaging c/w NTM. Did have weight loss during an acute illness and has started to regain some of it back. Reports a cough.     - Check JULIET, immunoglobulins, RF  - Repeat CT Chest in 3mn  - Check sputum for afb  - Start an airway clearance regimen 1-2 times per day   - 3% hypertonic saline neb   - Followed by albuterol neb   - Followed by 10 breaths with an acapella  - Avoid aerosolizing water  - Clean shower head regularly  - Referral to GI  - Encouraged regular, progressive exercise

## 2023-11-16 ENCOUNTER — LAB VISIT (OUTPATIENT)
Dept: LAB | Facility: HOSPITAL | Age: 73
End: 2023-11-16
Attending: INTERNAL MEDICINE
Payer: MEDICARE

## 2023-11-16 DIAGNOSIS — J47.9 BRONCHIECTASIS WITHOUT COMPLICATION: ICD-10-CM

## 2023-11-16 PROCEDURE — 87186 SC STD MICRODIL/AGAR DIL: CPT

## 2023-11-16 PROCEDURE — 87118 MYCOBACTERIC IDENTIFICATION: CPT

## 2023-11-16 PROCEDURE — 87116 MYCOBACTERIA CULTURE: CPT | Performed by: INTERNAL MEDICINE

## 2023-11-16 PROCEDURE — 87118 MYCOBACTERIC IDENTIFICATION: CPT | Mod: 91 | Performed by: INTERNAL MEDICINE

## 2023-11-16 PROCEDURE — 87150 DNA/RNA AMPLIFIED PROBE: CPT

## 2023-11-16 PROCEDURE — 87206 SMEAR FLUORESCENT/ACID STAI: CPT | Performed by: INTERNAL MEDICINE

## 2023-11-16 PROCEDURE — 87015 SPECIMEN INFECT AGNT CONCNTJ: CPT | Performed by: INTERNAL MEDICINE

## 2023-11-16 PROCEDURE — 87118 MYCOBACTERIC IDENTIFICATION: CPT | Mod: 59 | Performed by: INTERNAL MEDICINE

## 2023-12-04 ENCOUNTER — HOSPITAL ENCOUNTER (OUTPATIENT)
Dept: RADIOLOGY | Facility: HOSPITAL | Age: 73
Discharge: HOME | End: 2023-12-04
Attending: INTERNAL MEDICINE
Payer: MEDICARE

## 2023-12-04 DIAGNOSIS — J47.9 BRONCHIECTASIS WITHOUT COMPLICATION: ICD-10-CM

## 2023-12-04 DIAGNOSIS — J47.9 BRONCHIECTASIS WITHOUT COMPLICATION: Primary | ICD-10-CM

## 2023-12-04 PROCEDURE — 71250 CT THORAX DX C-: CPT | Mod: TC

## 2023-12-04 PROCEDURE — 71250 CT CHEST WITHOUT CONTRAST: ICD-10-PCS | Mod: 26,,, | Performed by: RADIOLOGY

## 2023-12-04 PROCEDURE — 71250 CT THORAX DX C-: CPT | Mod: 26,,, | Performed by: RADIOLOGY

## 2023-12-07 ENCOUNTER — OFFICE VISIT (OUTPATIENT)
Dept: PULMONOLOGY | Facility: CLINIC | Age: 73
End: 2023-12-07
Payer: MEDICARE

## 2023-12-07 VITALS
WEIGHT: 108 LBS | OXYGEN SATURATION: 98 % | DIASTOLIC BLOOD PRESSURE: 82 MMHG | BODY MASS INDEX: 20.39 KG/M2 | HEIGHT: 61 IN | HEART RATE: 76 BPM | SYSTOLIC BLOOD PRESSURE: 120 MMHG

## 2023-12-07 DIAGNOSIS — J47.9 BRONCHIECTASIS WITHOUT COMPLICATION: Primary | ICD-10-CM

## 2023-12-07 DIAGNOSIS — R93.89 ABNORMAL CHEST CT: ICD-10-CM

## 2023-12-07 PROCEDURE — 99213 OFFICE O/P EST LOW 20 MIN: CPT | Mod: S$PBB,,, | Performed by: INTERNAL MEDICINE

## 2023-12-07 PROCEDURE — 99999 PR PBB SHADOW E&M-EST. PATIENT-LVL III: ICD-10-PCS | Mod: PBBFAC,,, | Performed by: INTERNAL MEDICINE

## 2023-12-07 PROCEDURE — 99999 PR PBB SHADOW E&M-EST. PATIENT-LVL III: CPT | Mod: PBBFAC,,, | Performed by: INTERNAL MEDICINE

## 2023-12-07 PROCEDURE — 99213 PR OFFICE/OUTPT VISIT, EST, LEVL III, 20-29 MIN: ICD-10-PCS | Mod: S$PBB,,, | Performed by: INTERNAL MEDICINE

## 2023-12-07 PROCEDURE — 99213 OFFICE O/P EST LOW 20 MIN: CPT | Mod: PBBFAC | Performed by: INTERNAL MEDICINE

## 2023-12-07 NOTE — PROGRESS NOTES
Subjective:       Patient ID: Danielle Longo is a 73 y.o. female.    Chief Complaint: No chief complaint on file.    HPI:   Danielle Longo is a 73 y.o. female last seen by me 10/25/23 who presents for follow up.    Initial Eval 10/25/23  Underwent a CT Chest recently after an abnormal CXR with symptoms of dyspnea, sinus sx, earache, f/c/ns, malaise and a cough that started in July. Other symptoms improved apart from cough and fatigue. Cough was productive and is now dry. Denies nighttime symptoms. Lost weight from 110 to 102 during the illness which she has gained back.     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     Walks daily in the park.     + Recurrent sinusitis    Denies chest pain, orthopnea, PND, LE edema, palpitations, syncope/presyncope    Denies GERD sx, globus, dysphagia    Denies f/c/ns    Denies rashes, myalgias, arthralgias, hair/nail changes, eye changes, raynauds, mucosal ulcerations    Today;  Started her airway clearance several weeks ago and reports productive sputum.  Appetite has significantly improved. Energy is also improved.     Exercising daily walking 2-3 miles/day.    Denies f/c/ns, malaise, fatigue.    AFB sent last month is smear neg and NGTD.    Exposures:  Work- Office work  Tobacco- quit in   Inhalational Agents- Denies  Mold- No current mold. Did have mold after Alexandrea that was removed  Pets- Cats  Birds- Denies  Hot tubs- Denies    Family History of Lung Disease: Denies  Childhood History of Lung Disease: Denies    Review of Systems    As above    Social History     Tobacco Use    Smoking status: Former     Current packs/day: 0.00     Types: Cigarettes     Start date: 3/20/1974     Quit date: 1979     Years since quittin.2     Passive exposure: Never    Smokeless tobacco: Never    Tobacco comments:     social smoker, less than a pack a week   Substance Use Topics    Alcohol use: Yes     Alcohol/week: 2.0 standard drinks of alcohol     Types: 2  "Glasses of wine per week     Comment: glass of wine 1-2 times a week       Review of patient's allergies indicates:  No Known Allergies  Past Medical History:   Diagnosis Date    Hyperlipidemia     Hypothyroidism     Thyroid disease      Past Surgical History:   Procedure Laterality Date    cataract       EYE SURGERY  2018    cataracts     Current Outpatient Medications on File Prior to Visit   Medication Sig    albuterol (PROAIR HFA) 90 mcg/actuation inhaler Inhale 2 puffs into the lungs every 6 (six) hours as needed for Wheezing. Rescue    albuterol (PROVENTIL) 2.5 mg /3 mL (0.083 %) nebulizer solution Take 3 mLs (2.5 mg total) by nebulization every 6 (six) hours as needed for Wheezing. Rescue    fluticasone propionate (FLONASE) 50 mcg/actuation nasal spray 1 spray (50 mcg total) by Each Nostril route once daily.    levocetirizine (XYZAL) 5 MG tablet TAKE 1 TABLET(5 MG) BY MOUTH EVERY EVENING    mucus clearing device (ACAPELLA, FLUTTER) by Misc.(Non-Drug; Combo Route) route 2 (two) times a day.    sodium chloride 3% 3 % nebulizer solution Take 4 mLs by nebulization as needed for Other.    SYNTHROID 50 mcg tablet TAKE 1 TABLET BEFORE BREAKFAST    traZODone (DESYREL) 50 MG tablet Take 1 tablet (50 mg total) by mouth every evening.     No current facility-administered medications on file prior to visit.       Objective:      Vitals:    12/07/23 1051   BP: 120/82   BP Location: Right arm   Patient Position: Sitting   BP Method: Medium (Manual)   Pulse: 76   SpO2: 98%   Weight: 49 kg (108 lb 0.4 oz)   Height: 5' 1" (1.549 m)       Physical Exam   Constitutional: She appears well-developed and well-nourished.   HENT:   Nose: No mucosal edema.   Mouth/Throat: Oropharynx is clear and moist. Mallampati Score: I.   Neck: No tracheal deviation present.   Cardiovascular: Normal rate and regular rhythm.   Pulmonary/Chest: Normal expansion, symmetric chest wall expansion and effort normal. No respiratory distress. She has no " wheezes. She has no rhonchi. She has no rales.   Abdominal: She exhibits no distension.   Musculoskeletal:         General: No edema.      Cervical back: Neck supple.   Lymphadenopathy: No supraclavicular adenopathy is present.     She has no cervical adenopathy.   Skin: Skin is warm and dry. No cyanosis or erythema. Nails show no clubbing.   Nursing note and vitals reviewed.      Personal Diagnostic Review    Laboratory:  5/22/23  Bicarb- 27  Eosinophils- 0.0    AFB 11/16/23- smear neg, NGTD    CT Chest 9/7/23- Images personally reviewed. I agree w/ the radiologist who notes;  1. Bilateral consolidative changes in the right middle lobe and lingula with bronchiectasis/bronchiolectasis associated with bilateral patchy confluent tree in bud and irregular opacities as described above.  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  2. Incidental findings, as above.    CXR 9/6/23- Images personally reviewed. I agree w/ the radiologist who notes;  The trachea is unremarkable.  There is central bronchiectasis.  The cardiomediastinal silhouette is within normal limits.  There is no evidence of free air beneath the hemidiaphragms.  There are no pleural effusions.  There is no evidence of a pneumothorax.  There is no evidence of pneumomediastinum.  There are patchy reticulonodular densities throughout the right hemithorax.  There also scattered fibrotic changes.  There is no focal consolidation.     There are degenerative changes in the osseous structures.  There is levoconvex scoliosis of the thoracolumbar alignment.  There is no evidence of a fracture.     Additional evaluation with noncontrast CT scan of the chest, as clinically warranted.      Assessment:     Problem List Items Addressed This Visit          Pulmonary    Bronchiectasis without complication - Primary     maging c/w NTM. AFB sent 11/16/23 was smear neg and NGTD.    JULIET borderline elevated, Immunoglobulins unremarkable and RF  neg    Repeat CT Chest with improvement.    Started on airway clearance and reports improved appetite, energy, weight gain and dyspnea.     - F/u afb  - Cont airway clearance regimen 1-2 times per day              - 3% hypertonic saline neb              - Followed by albuterol neb              - Followed by 10 breaths with an acapella  - Avoid aerosolizing water  - Clean shower head regularly  - Referral to GI for aggressive reflux management  - Encouraged regular, progressive exercise            Other    Abnormal chest CT     Per bronchiectasis

## 2023-12-08 NOTE — ASSESSMENT & PLAN NOTE
mary c/w NTM. AFB sent 11/16/23 was smear neg and NGTD.    JULIET borderline elevated, Immunoglobulins unremarkable and RF neg    Repeat CT Chest with improvement.    Started on airway clearance and reports improved appetite, energy, weight gain and dyspnea.     - F/u afb  - Cont airway clearance regimen 1-2 times per day              - 3% hypertonic saline neb              - Followed by albuterol neb              - Followed by 10 breaths with an acapella  - Avoid aerosolizing water  - Clean shower head regularly  - Referral to GI for aggressive reflux management  - Encouraged regular, progressive exercise

## 2024-01-03 DIAGNOSIS — J47.9 BRONCHIECTASIS WITHOUT COMPLICATION: ICD-10-CM

## 2024-01-04 RX ORDER — SODIUM CHLORIDE FOR INHALATION 3 %
VIAL, NEBULIZER (ML) INHALATION 2 TIMES DAILY
Qty: 240 ML | Refills: 5 | Status: SHIPPED | OUTPATIENT
Start: 2024-01-04

## 2024-02-08 ENCOUNTER — OFFICE VISIT (OUTPATIENT)
Dept: GASTROENTEROLOGY | Facility: CLINIC | Age: 74
End: 2024-02-08
Payer: MEDICARE

## 2024-02-08 ENCOUNTER — TELEPHONE (OUTPATIENT)
Dept: ENDOSCOPY | Facility: HOSPITAL | Age: 74
End: 2024-02-08
Payer: MEDICARE

## 2024-02-08 VITALS
DIASTOLIC BLOOD PRESSURE: 93 MMHG | SYSTOLIC BLOOD PRESSURE: 164 MMHG | BODY MASS INDEX: 20.85 KG/M2 | HEIGHT: 61 IN | WEIGHT: 110.44 LBS | HEART RATE: 70 BPM

## 2024-02-08 DIAGNOSIS — K21.9 GASTROESOPHAGEAL REFLUX DISEASE, UNSPECIFIED WHETHER ESOPHAGITIS PRESENT: ICD-10-CM

## 2024-02-08 DIAGNOSIS — J47.9 BRONCHIECTASIS WITHOUT COMPLICATION: Primary | ICD-10-CM

## 2024-02-08 PROCEDURE — 99204 OFFICE O/P NEW MOD 45 MIN: CPT | Mod: S$PBB,,, | Performed by: INTERNAL MEDICINE

## 2024-02-08 PROCEDURE — 99213 OFFICE O/P EST LOW 20 MIN: CPT | Mod: PBBFAC | Performed by: INTERNAL MEDICINE

## 2024-02-08 PROCEDURE — 99999 PR PBB SHADOW E&M-EST. PATIENT-LVL III: CPT | Mod: PBBFAC,,, | Performed by: INTERNAL MEDICINE

## 2024-02-08 NOTE — PROGRESS NOTES
"Reason for visit: Bronchiectasis    HPI:  is a 73 year old lady with new diagnosis of bronchiectasis in 2023. Medical history includes HLP, Anxiety and Hypothyroidism. Recently had symptoms of dyspnea, sinus symptoms, earache, malaise and a cough that started in July 2023. Other symptoms improved apart from cough and fatigue. Cough continues to be productive with the nebulizer. Denies nighttime symptoms. Lost weight from 110 to 102 during the illness which she has gained back. 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 She was asked to come here today for "aggressive reflux management ". Denies any dysphagia, odynophagia, nausea, vomiting, heartburn or acid regurgitation. Has occasional globus sensation. No abdominal pains, changes in bowel pattern, blood/ mucus in stool or unintentional weight loss. No melena or maroon stools. No recent changes in diet or medications. No family history of IBD, Celiac disease or GI malignancy. No regular NSAIDs, alcohol (1-2 glasses a week) or tobacco use (none). No recent antibiotic use, travels or sick contacts. No prior history of C.diff. Has been doing FIT test annually. No prior abdominal surgeries. Has noted coughing spells after meals.    Past medical, surgical, social and family history reviewed in epic    Medication allergies reviewed in epic    Review of systems:    Constitutional:  No fever, no chills, no weight loss, appetite is normal  Eyes:  No visual changes or red eyes  ENT:  No odynophagia or hoarseness of voice  Cardiovascular:  No angina or palpitation  Respiratory:  No shortness breath or wheezing  Genitourinary:  No dysuria or frequency  Musculoskeletal:  No myalgias or arthralgias  Skin:  No pruritus or eczema  Neurologic:  No headache or seizures  Psychiatric:  No anxiety depression  Gastrointestinal:  See HPI    Physical exam:  Vitals see epic, awake, alert, oriented x3 in no acute distress    Neck:  " Supple, no carotid bruit, no cervical adenopathy  Abdomen:  Flat, soft, nontender, nondistended, no masses palpable, no hepatosplenomegaly detected, bowel sounds are normal, no ascites clinically detectable  Eyes:  Conjunctivae anicteric, not injected  ENT:  Oral mucosa moist  Cardiovascular:  S1, S2 normal, no murmurs, no gallops, no abdominal bruits heard  Respiratory:  Bilateral air entry equal, no rhonchi, no crackles, normal effort  Skin:  No palmar erythema or spider angiomata  Neurologic:  No asterixis or tremors  Psychiatric:  Affect appropriate, proper judgment, proper insight, oriented to place and time  Lower extremities:  No pedal edema    Recent labs and imaging reports reviewed.      Impression: Bronchiectasis with suspected GERD    Recommendations:     Schedule EGD and barium esophagram  Reflux precautions discussed

## 2024-02-08 NOTE — TELEPHONE ENCOUNTER
"    Endo Case Request  Received: Today  Gabe Charles MD Piglia, Ashley, ABNER  Procedure: EGD    Diagnosis: GERD    Procedure Timin-4 weeks    *If within 4 weeks selected, please danette as high priority*    *If greater than 12 weeks, please select "4-12 weeks" and delay sending until 2 months prior to requested date*    Provider: Myself    Location: [facility]    Additional Scheduling Information: No scheduling concerns    Prep Specifications:N/A    Have you attached a patient to this message: Yes  "

## 2024-02-13 LAB — MYCOBACTERIUM SPEC CULT: ABNORMAL

## 2024-02-20 ENCOUNTER — TELEPHONE (OUTPATIENT)
Dept: ENDOSCOPY | Facility: HOSPITAL | Age: 74
End: 2024-02-20
Payer: MEDICARE

## 2024-02-27 LAB
ACID FAST MOD KINY STN SPEC: ABNORMAL
MYCOBACTERIUM SPEC QL CULT: ABNORMAL

## 2024-02-29 ENCOUNTER — PATIENT MESSAGE (OUTPATIENT)
Dept: ENDOSCOPY | Facility: HOSPITAL | Age: 74
End: 2024-02-29
Payer: MEDICARE

## 2024-02-29 ENCOUNTER — TELEPHONE (OUTPATIENT)
Dept: ENDOSCOPY | Facility: HOSPITAL | Age: 74
End: 2024-02-29
Payer: MEDICARE

## 2024-02-29 NOTE — TELEPHONE ENCOUNTER
Called to schedule egd with Dr. Charles. No answer. Lvm with call back number. Portal message sent.

## 2024-03-07 ENCOUNTER — PATIENT MESSAGE (OUTPATIENT)
Dept: ENDOSCOPY | Facility: HOSPITAL | Age: 74
End: 2024-03-07
Payer: MEDICARE

## 2024-03-07 ENCOUNTER — TELEPHONE (OUTPATIENT)
Dept: ENDOSCOPY | Facility: HOSPITAL | Age: 74
End: 2024-03-07
Payer: MEDICARE

## 2024-03-13 ENCOUNTER — OFFICE VISIT (OUTPATIENT)
Dept: PODIATRY | Facility: CLINIC | Age: 74
End: 2024-03-13
Payer: MEDICARE

## 2024-03-13 VITALS
TEMPERATURE: 98 F | RESPIRATION RATE: 18 BRPM | SYSTOLIC BLOOD PRESSURE: 163 MMHG | HEART RATE: 70 BPM | HEIGHT: 61 IN | WEIGHT: 109.56 LBS | BODY MASS INDEX: 20.69 KG/M2 | DIASTOLIC BLOOD PRESSURE: 92 MMHG | OXYGEN SATURATION: 96 %

## 2024-03-13 DIAGNOSIS — M21.612 BUNION OF LEFT FOOT: Primary | ICD-10-CM

## 2024-03-13 DIAGNOSIS — L84 CORN OR CALLUS: ICD-10-CM

## 2024-03-13 DIAGNOSIS — G89.29 CHRONIC PAIN IN LEFT FOOT: ICD-10-CM

## 2024-03-13 DIAGNOSIS — M79.672 CHRONIC PAIN IN LEFT FOOT: ICD-10-CM

## 2024-03-13 DIAGNOSIS — M20.5X2 CROSSOVER TOE DEFORMITY OF LEFT FOOT: ICD-10-CM

## 2024-03-13 PROCEDURE — 99999 PR PBB SHADOW E&M-EST. PATIENT-LVL III: CPT | Mod: PBBFAC,,, | Performed by: STUDENT IN AN ORGANIZED HEALTH CARE EDUCATION/TRAINING PROGRAM

## 2024-03-13 PROCEDURE — 29550 STRAPPING OF TOES: CPT | Mod: TA,T1,PBBFAC | Performed by: STUDENT IN AN ORGANIZED HEALTH CARE EDUCATION/TRAINING PROGRAM

## 2024-03-13 PROCEDURE — 99213 OFFICE O/P EST LOW 20 MIN: CPT | Mod: PBBFAC,25 | Performed by: STUDENT IN AN ORGANIZED HEALTH CARE EDUCATION/TRAINING PROGRAM

## 2024-03-13 PROCEDURE — 99204 OFFICE O/P NEW MOD 45 MIN: CPT | Mod: 25,S$PBB,, | Performed by: STUDENT IN AN ORGANIZED HEALTH CARE EDUCATION/TRAINING PROGRAM

## 2024-03-13 PROCEDURE — 29550 STRAPPING OF TOES: CPT | Mod: TA,T1,S$PBB, | Performed by: STUDENT IN AN ORGANIZED HEALTH CARE EDUCATION/TRAINING PROGRAM

## 2024-03-13 NOTE — PROGRESS NOTES
Subjective:     Patient    Danielle Longo is a 73 y.o. female.    Problem    New to [facility]. Presents for left foot bunion deformity with 2nd toe overriding 1st toe. Has been progressive over years. Now has to walk more to improve aerobic capacity due to bronchiectasis; having difficulty due to pain in footwear with foot deformity. Also with callusing on left 2nd toe due to the deformity. Has attempted change in footwear, change in activity, adhesive bandages without significant improvement. Also recently had tight spasm feelings in left medial foot and back pain.     History    History obtained from patient and review of medical records.     Past Medical History:   Diagnosis Date    Hyperlipidemia     Hypothyroidism     Thyroid disease        Past Surgical History:   Procedure Laterality Date    cataract       EYE SURGERY  2018    cataracts        Objective:     Vitals  Wt Readings from Last 1 Encounters:   03/13/24 49.7 kg (109 lb 9.1 oz)     Temp Readings from Last 1 Encounters:   03/13/24 97.6 °F (36.4 °C)     BP Readings from Last 1 Encounters:   03/13/24 (!) 163/92     Pulse Readings from Last 1 Encounters:   03/13/24 70       Dermatological Exam    Skin:   Pedal hair growth, skin color, and skin texture normal on left; callus plantar 2nd toe where it rubs against 1st toenail which is mildly tender    Nails:  All nails normal in length, thickness, color    Vascular Exam    Arteries:   Posterior tibial artery palpable on left  Dorsalis pedis artery palpable on left    Veins:   Superficial veins unremarkable on left    Swelling:   None on left    Neurological Exam    [place] touch test:  Left foot protective sensation intact    Provocation Sign:  - at tibial nerve on left     Musculoskeletal Exam    Footwear:  Athletic on right  Athletic on left    Gait Exam:   Ambulatory Status: Ambulatory  Gait: Normal  Assistive Devices: None    Foot Progression Angle:  Normal on right  Normal on left     Left  Lower Extremity Additional Findings:  Hallux valgus with overriding 2nd toe, mild crossover deformity 2nd toe without pain at 2nd plantar plate; minimal pain throughout PROM 1st MTPJ  Left foot and ankle function, strength, and range of motion unremarkable except as noted above.    Imaging and Other Tests    Imaging:  Independently reviewed and interpreted imaging, findings are as follows: N/A     Assessment:     Encounter Diagnoses   Name Primary?    Bunion of left foot Yes    Crossover toe deformity of left foot     Corn or callus     Chronic pain in left foot         Plan:     I counseled the patient on her conditions, their implications and medical management.    Left foot bunion, 2nd crossover toe deformity, callus, pain: chronic stable  -Debrided left 2nd toe callus as a courtesy.   -Recommended footwear with wide flexible toe box.  -Recommended regular moisturizing of callused area.   -Recommended silicone toe sleeve or similar as needed, provided.  -Recommended taping of toes, athletic tape provided. Applied plantarflexion-abduction taping to left 2nd toe and adduction taping to left 1st toe using Tensoplast athletic tape, patient tolerated well and reported immediate improvement.   -Briefly discussed surgical options in case conservative treatment is unsuccessful.      Return to clinic PRN.

## 2024-03-18 ENCOUNTER — TELEPHONE (OUTPATIENT)
Dept: ENDOSCOPY | Facility: HOSPITAL | Age: 74
End: 2024-03-18
Payer: MEDICARE

## 2024-03-18 NOTE — TELEPHONE ENCOUNTER
Called to schedule EGD with Dr. Charles. No answer. Lvm with callback number. 4th attempt. MD notified.

## 2024-05-30 ENCOUNTER — HOSPITAL ENCOUNTER (OUTPATIENT)
Dept: RADIOLOGY | Facility: HOSPITAL | Age: 74
Discharge: HOME | End: 2024-05-30
Attending: INTERNAL MEDICINE
Payer: MEDICARE

## 2024-05-30 DIAGNOSIS — Z78.0 MENOPAUSE: ICD-10-CM

## 2024-05-30 PROCEDURE — 77080 DXA BONE DENSITY AXIAL: CPT | Mod: TC

## 2024-05-30 PROCEDURE — 77080 DXA BONE DENSITY AXIAL: CPT | Mod: 26,,, | Performed by: INTERNAL MEDICINE

## 2024-05-31 ENCOUNTER — OFFICE VISIT (OUTPATIENT)
Dept: PULMONOLOGY | Facility: CLINIC | Age: 74
End: 2024-05-31
Payer: MEDICARE

## 2024-05-31 ENCOUNTER — HOSPITAL ENCOUNTER (OUTPATIENT)
Dept: PULMONOLOGY | Facility: CLINIC | Age: 74
Discharge: HOME | End: 2024-05-31
Payer: MEDICARE

## 2024-05-31 VITALS
SYSTOLIC BLOOD PRESSURE: 130 MMHG | OXYGEN SATURATION: 96 % | HEIGHT: 61 IN | BODY MASS INDEX: 20.69 KG/M2 | DIASTOLIC BLOOD PRESSURE: 82 MMHG | HEART RATE: 78 BPM | WEIGHT: 109.56 LBS

## 2024-05-31 DIAGNOSIS — A31.0 NONTUBERCULOUS MYCOBACTERIAL PULMONARY DISEASE: Primary | ICD-10-CM

## 2024-05-31 DIAGNOSIS — J47.9 BRONCHIECTASIS WITHOUT COMPLICATION: ICD-10-CM

## 2024-05-31 LAB
DLCO SINGLE BREATH LLN: 13.25
DLCO SINGLE BREATH PRE REF: 52.3 %
DLCO SINGLE BREATH REF: 18.98
DLCOC SBVA LLN: 2.67
DLCOC SBVA REF: 4.28
DLCOC SINGLE BREATH LLN: 13.25
DLCOC SINGLE BREATH REF: 18.98
DLCOCSBVAULN: 5.89
DLCOCSINGLEBREATHULN: 24.72
DLCOSINGLEBREATHULN: 24.72
DLCOVA LLN: 2.67
DLCOVA PRE REF: 85.8 %
DLCOVA PRE: 3.67 ML/(MIN*MMHG*L) (ref 2.67–5.89)
DLCOVA REF: 4.28
DLCOVAULN: 5.89
ERVN2 LLN: -16449.43
ERVN2 PRE REF: 78.5 %
ERVN2 PRE: 0.45 L (ref -16449.43–16450.57)
ERVN2 REF: 0.57
ERVN2ULN: ABNORMAL
FEF 25 75 LLN: 0.74
FEF 25 75 PRE REF: 69.3 %
FEF 25 75 REF: 1.65
FET100 CHG: -7.7 %
FEV05 LLN: 0.66
FEV05 REF: 1.52
FEV1 CHG: 11.5 %
FEV1 FVC LLN: 64
FEV1 FVC PRE REF: 89.5 %
FEV1 FVC REF: 78
FEV1 LLN: 1.38
FEV1 PRE REF: 73.2 %
FEV1 REF: 1.91
FEV1 VOL CHG: 0.16
FRCN2 LLN: 1.72
FRCN2 PRE REF: 77.3 %
FRCN2 REF: 2.54
FRCN2ULN: 3.37
FVC CHG: 0.1 %
FVC LLN: 1.78
FVC PRE REF: 81.1 %
FVC REF: 2.47
FVC VOL CHG: 0
ICN2REF: 1.62
IVC PRE: 1.72 L (ref 1.78–3.19)
IVC SINGLE BREATH LLN: 1.78
IVC SINGLE BREATH PRE REF: 69.8 %
IVC SINGLE BREATH REF: 2.47
IVCSINGLEBREATHULN: 3.19
LLN IC N2: -16448.38
PEF LLN: 3.41
PEF PRE REF: 34.4 %
PEF REF: 4.96
PHYSICIAN COMMENT: ABNORMAL
POST FEF 25 75: 1.42 L/S (ref 0.74–2.97)
POST FET 100: 6.17 SEC
POST FEV1 FVC: 77.97 % (ref 64.39–90.04)
POST FEV1: 1.56 L (ref 1.38–2.43)
POST FEV5: 1.28 L (ref 0.66–2.37)
POST FVC: 2 L (ref 1.78–3.19)
POST PEF: 4.11 L/S (ref 3.41–6.52)
PRE DLCO: 9.93 ML/(MIN*MMHG) (ref 13.25–24.72)
PRE FEF 25 75: 1.14 L/S (ref 0.74–2.97)
PRE FET 100: 6.69 SEC
PRE FEV05 REF: 55.7 %
PRE FEV1 FVC: 69.98 % (ref 64.39–90.04)
PRE FEV1: 1.4 L (ref 1.38–2.43)
PRE FEV5: 0.84 L (ref 0.66–2.37)
PRE FRC N2: 1.97 L (ref 1.72–3.37)
PRE FVC: 2 L (ref 1.78–3.19)
PRE IC N2: 1.55 L (ref -16448.38–16451.62)
PRE PEF: 1.71 L/S (ref 3.41–6.52)
PRE REF IC N2: 96 %
RVN2 LLN: 1.4
RVN2 PRE REF: 76.9 %
RVN2 PRE: 1.52 L (ref 1.4–2.55)
RVN2 REF: 1.97
RVN2TLCN2 LLN: 34.19
RVN2TLCN2 PRE REF: 98.5 %
RVN2TLCN2 PRE: 43.12 % (ref 34.19–53.37)
RVN2TLCN2 REF: 43.78
RVN2TLCN2ULN: 53.37
RVN2ULN: 2.55
TLCN2 LLN: 3.45
TLCN2 PRE REF: 79.3 %
TLCN2 PRE: 3.52 L (ref 3.45–5.42)
TLCN2 REF: 4.44
TLCN2ULN: 5.42
ULN IC N2: ABNORMAL
VA PRE: 2.7 L (ref 4.29–4.29)
VA SINGLE BREATH LLN: 4.29
VA SINGLE BREATH PRE REF: 63.1 %
VA SINGLE BREATH REF: 4.29
VASINGLEBREATHULN: 4.29
VCMAXN2 LLN: 1.78
VCMAXN2 PRE REF: 81.1 %
VCMAXN2 PRE: 2 L (ref 1.78–3.19)
VCMAXN2 REF: 2.47
VCMAXN2ULN: 3.19

## 2024-05-31 PROCEDURE — 94729 DIFFUSING CAPACITY: CPT | Mod: PBBFAC | Performed by: INTERNAL MEDICINE

## 2024-05-31 PROCEDURE — 94729 DIFFUSING CAPACITY: CPT | Mod: 26,S$PBB,, | Performed by: INTERNAL MEDICINE

## 2024-05-31 PROCEDURE — 94060 EVALUATION OF WHEEZING: CPT | Mod: 26,S$PBB,, | Performed by: INTERNAL MEDICINE

## 2024-05-31 PROCEDURE — 99213 OFFICE O/P EST LOW 20 MIN: CPT | Mod: PBBFAC,25 | Performed by: INTERNAL MEDICINE

## 2024-05-31 PROCEDURE — 94727 GAS DIL/WSHOT DETER LNG VOL: CPT | Mod: 26,S$PBB,, | Performed by: INTERNAL MEDICINE

## 2024-05-31 PROCEDURE — 94060 EVALUATION OF WHEEZING: CPT | Mod: PBBFAC | Performed by: INTERNAL MEDICINE

## 2024-05-31 PROCEDURE — 99214 OFFICE O/P EST MOD 30 MIN: CPT | Mod: 25,S$PBB,, | Performed by: INTERNAL MEDICINE

## 2024-05-31 PROCEDURE — 94727 GAS DIL/WSHOT DETER LNG VOL: CPT | Mod: PBBFAC | Performed by: INTERNAL MEDICINE

## 2024-05-31 PROCEDURE — 99999 PR PBB SHADOW E&M-EST. PATIENT-LVL III: CPT | Mod: PBBFAC,,, | Performed by: INTERNAL MEDICINE

## 2024-05-31 NOTE — PROGRESS NOTES
Subjective:       Patient ID: Danielle Longo is a 74 y.o. female.    Chief Complaint: No chief complaint on file.    HPI:   Danielle Longo is a 74 y.o. female last seen by me 12/7/23 who presents for follow up.    Initial Eval 10/25/23  Underwent a CT Chest recently after an abnormal CXR with symptoms of dyspnea, sinus sx, earache, f/c/ns, malaise and a cough that started in July. Other symptoms improved apart from cough and fatigue. Cough was productive and is now dry. Denies nighttime symptoms. Lost weight from 110 to 102 during the illness which she has gained back.     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     Walks daily in the park.     + Recurrent sinusitis    Denies chest pain, orthopnea, PND, LE edema, palpitations, syncope/presyncope    Denies GERD sx, globus, dysphagia    Denies f/c/ns    Denies rashes, myalgias, arthralgias, hair/nail changes, eye changes, raynauds, mucosal ulcerations    12/7/23 HPI;  Started her airway clearance several weeks ago and reports productive sputum.  Appetite has significantly improved. Energy is also improved.     Exercising daily walking 2-3 miles/day.    Denies f/c/ns, malaise, fatigue.    AFB sent last month is smear neg and NGTD.    Today:  Energy and weight are stable. Respiratory symptoms unchanged. AFB on 11/16/23     Not able to exercise as much but does note temporary improvement in her dyspnea when she walked several days in a row.    Denies any URI/LRTIs.     Exposures:  Work- Office work  Tobacco- quit in 1979  Inhalational Agents- Denies  Mold- No current mold. Did have mold after Alexandrea that was removed  Pets- Cats  Birds- Denies  Hot tubs- Denies    Family History of Lung Disease: Denies  Childhood History of Lung Disease: Denies    Review of Systems    As above    Social History     Tobacco Use    Smoking status: Former     Current packs/day: 0.00     Types: Cigarettes     Start date: 3/20/1974     Quit date: 9/30/1979     Years  "since quittin.7     Passive exposure: Never    Smokeless tobacco: Never    Tobacco comments:     social smoker, less than a pack a week   Substance Use Topics    Alcohol use: Yes     Alcohol/week: 2.0 standard drinks of alcohol     Types: 2 Glasses of wine per week     Comment: glass of wine 1-2 times a week       Review of patient's allergies indicates:  No Known Allergies  Past Medical History:   Diagnosis Date    Hyperlipidemia     Hypothyroidism     Thyroid disease      Past Surgical History:   Procedure Laterality Date    cataract       EYE SURGERY  2018    cataracts     Current Outpatient Medications on File Prior to Visit   Medication Sig    albuterol (PROAIR HFA) 90 mcg/actuation inhaler Inhale 2 puffs into the lungs every 6 (six) hours as needed for Wheezing. Rescue    albuterol (PROVENTIL) 2.5 mg /3 mL (0.083 %) nebulizer solution Take 3 mLs (2.5 mg total) by nebulization every 6 (six) hours as needed for Wheezing. Rescue    fluticasone propionate (FLONASE) 50 mcg/actuation nasal spray 1 spray (50 mcg total) by Each Nostril route once daily.    levocetirizine (XYZAL) 5 MG tablet TAKE 1 TABLET(5 MG) BY MOUTH EVERY EVENING    mucus clearing device (ACAPELLA, FLUTTER) by Misc.(Non-Drug; Combo Route) route 2 (two) times a day.    sodium chloride 3% 3 % nebulizer solution Take by nebulization 2 (two) times a day.    SYNTHROID 50 mcg tablet TAKE 1 TABLET BEFORE BREAKFAST    traZODone (DESYREL) 50 MG tablet Take 1 tablet (50 mg total) by mouth every evening.     No current facility-administered medications on file prior to visit.       Objective:      Vitals:    24 1429   BP: 130/82   BP Location: Left arm   Patient Position: Sitting   BP Method: Medium (Manual)   Pulse: 78   SpO2: 96%   Weight: 49.7 kg (109 lb 9.1 oz)   Height: 5' 1" (1.549 m)         Physical Exam   Constitutional: She appears well-developed and well-nourished.   HENT:   Nose: No mucosal edema.   Mouth/Throat: Oropharynx is clear and " moist. Mallampati Score: I.   Neck: No tracheal deviation present.   Cardiovascular: Normal rate and regular rhythm.   Pulmonary/Chest: Normal expansion, symmetric chest wall expansion and effort normal. No respiratory distress. She has no wheezes. She has no rhonchi. She has no rales.   Abdominal: She exhibits no distension.   Musculoskeletal:         General: No edema.      Cervical back: Neck supple.   Lymphadenopathy: No supraclavicular adenopathy is present.     She has no cervical adenopathy.   Skin: Skin is warm and dry. No cyanosis or erythema. Nails show no clubbing.   Nursing note and vitals reviewed.      Personal Diagnostic Review    Laboratory:  11/2023   AFB + M chelonae    5/22/23  Bicarb- 27  Eosinophils- 0.0    AFB 11/16/23- smear neg, NGTD    CT Chest 12/4/23- Images personally reviewed. I agree w/ the radiologist who notes;  1. Compared to CT chest dated 09/07/2023: Findings remain consistent with atypical mycobacterial infection with interval mild improvement of superimposed infectious/inflammatory process in the lungs.  2. Stable left upper lobe perifissural nodule measuring 8 x 5 mm.  Consider follow-up by CT chest in 6-12 months.  3. Additional findings, as above.    CT Chest 9/7/23- Images personally reviewed. I agree w/ the radiologist who notes;  1. Bilateral consolidative changes in the right middle lobe and lingula with bronchiectasis/bronchiolectasis associated with bilateral patchy confluent tree in bud and irregular opacities as described above.  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  2. Incidental findings, as above.    CXR 9/6/23- Images personally reviewed. I agree w/ the radiologist who notes;  The trachea is unremarkable.  There is central bronchiectasis.  The cardiomediastinal silhouette is within normal limits.  There is no evidence of free air beneath the hemidiaphragms.  There are no pleural effusions.  There is no evidence of a  pneumothorax.  There is no evidence of pneumomediastinum.  There are patchy reticulonodular densities throughout the right hemithorax.  There also scattered fibrotic changes.  There is no focal consolidation.     There are degenerative changes in the osseous structures.  There is levoconvex scoliosis of the thoracolumbar alignment.  There is no evidence of a fracture.     Additional evaluation with noncontrast CT scan of the chest, as clinically warranted.    PFTs   5/31/24  FVC: 2.00 (81.1 % predicted), FEV1: 1.40 (73.2 % predicted), FEV1/FVC:  70, TLC: 3.52 (79.3 % predicted), DLCO: 9.93 (52.3 % predicted)    Assessment:     Problem List Items Addressed This Visit          Pulmonary    Bronchiectasis without complication     JULIET borderline elevated, Immunoglobulins unremarkable and RF neg. + afb w/ m chelonae.      Cont airway clearance and repeat afb     - F/u afb  - Cont airway clearance regimen 1-2 times per day              - 3% hypertonic saline neb              - Followed by albuterol neb              - Followed by 10 breaths with an acapella  - Avoid aerosolizing water  - Encouraged regular, progressive exercise    - CT Chest to eval for progression and pulm nodule stability, ordered            ID    Nontuberculous mycobacterial pulmonary disease - Primary     M chelonea on afb 11/2023. Unclear if colonizer vs infection. Repeat afb today. Weight and energy are stable. Using airway clearance. Plan per bronchiectasis         Relevant Orders    AFB CULTURE & SMEAR (Completed)    CT Chest Without Contrast (Completed)       35 minutes of total time spent on the encounter, which includes face to face time and non-face to face time preparing to see the patient (eg, review of tests), Obtaining and/or reviewing separately obtained history, Documenting clinical information in the electronic or other health record, Independently interpreting results (not separately reported) and communicating results to the  patient/family/caregiver, or Care coordination (not separately reported).

## 2024-06-04 ENCOUNTER — HOSPITAL ENCOUNTER (OUTPATIENT)
Dept: RADIOLOGY | Facility: HOSPITAL | Age: 74
Discharge: HOME | End: 2024-06-04
Attending: INTERNAL MEDICINE
Payer: MEDICARE

## 2024-06-04 DIAGNOSIS — A31.0 NONTUBERCULOUS MYCOBACTERIAL PULMONARY DISEASE: ICD-10-CM

## 2024-06-04 PROCEDURE — 71250 CT THORAX DX C-: CPT | Mod: 26,,, | Performed by: RADIOLOGY

## 2024-06-04 PROCEDURE — 71250 CT THORAX DX C-: CPT | Mod: TC

## 2024-06-10 ENCOUNTER — LAB VISIT (OUTPATIENT)
Dept: LAB | Facility: HOSPITAL | Age: 74
End: 2024-06-10
Attending: INTERNAL MEDICINE
Payer: MEDICARE

## 2024-06-10 DIAGNOSIS — A31.0 NONTUBERCULOUS MYCOBACTERIAL PULMONARY DISEASE: ICD-10-CM

## 2024-06-10 PROCEDURE — 87206 SMEAR FLUORESCENT/ACID STAI: CPT | Performed by: INTERNAL MEDICINE

## 2024-06-10 PROCEDURE — 87015 SPECIMEN INFECT AGNT CONCNTJ: CPT | Performed by: INTERNAL MEDICINE

## 2024-06-10 PROCEDURE — 87116 MYCOBACTERIA CULTURE: CPT | Performed by: INTERNAL MEDICINE

## 2024-06-11 LAB
ACID FAST MOD KINY STN SPEC: NORMAL
MYCOBACTERIUM SPEC QL CULT: NORMAL

## 2024-06-12 PROBLEM — A31.0: Status: ACTIVE | Noted: 2024-06-12

## 2024-06-12 NOTE — ASSESSMENT & PLAN NOTE
JULIET borderline elevated, Immunoglobulins unremarkable and RF neg. + afb w/ m chelonae.      Cont airway clearance and repeat afb     - F/u afb  - Cont airway clearance regimen 1-2 times per day              - 3% hypertonic saline neb              - Followed by albuterol neb              - Followed by 10 breaths with an acapella  - Avoid aerosolizing water  - Encouraged regular, progressive exercise    - CT Chest to eval for progression and pulm nodule stability, ordered

## 2024-06-12 NOTE — ASSESSMENT & PLAN NOTE
SHARMILA llanes on afb 11/2023. Unclear if colonizer vs infection. Repeat afb today. Weight and energy are stable. Using airway clearance. Plan per bronchiectasis

## 2024-06-14 ENCOUNTER — LAB VISIT (OUTPATIENT)
Dept: LAB | Facility: HOSPITAL | Age: 74
End: 2024-06-14
Payer: MEDICARE

## 2024-06-14 ENCOUNTER — OFFICE VISIT (OUTPATIENT)
Dept: INTERNAL MEDICINE | Facility: CLINIC | Age: 74
End: 2024-06-14
Payer: MEDICARE

## 2024-06-14 VITALS
SYSTOLIC BLOOD PRESSURE: 138 MMHG | BODY MASS INDEX: 20.73 KG/M2 | OXYGEN SATURATION: 96 % | HEIGHT: 61 IN | DIASTOLIC BLOOD PRESSURE: 76 MMHG | WEIGHT: 109.81 LBS | HEART RATE: 76 BPM | TEMPERATURE: 98 F | RESPIRATION RATE: 16 BRPM

## 2024-06-14 DIAGNOSIS — Z12.31 ENCOUNTER FOR SCREENING MAMMOGRAM FOR BREAST CANCER: ICD-10-CM

## 2024-06-14 DIAGNOSIS — E03.8 OTHER SPECIFIED HYPOTHYROIDISM: ICD-10-CM

## 2024-06-14 DIAGNOSIS — Z11.59 ENCOUNTER FOR HEPATITIS C SCREENING TEST FOR LOW RISK PATIENT: ICD-10-CM

## 2024-06-14 DIAGNOSIS — M81.0 OSTEOPOROSIS, UNSPECIFIED OSTEOPOROSIS TYPE, UNSPECIFIED PATHOLOGICAL FRACTURE PRESENCE: ICD-10-CM

## 2024-06-14 DIAGNOSIS — Z00.00 ANNUAL PHYSICAL EXAM: Primary | ICD-10-CM

## 2024-06-14 DIAGNOSIS — R73.01 IMPAIRED FASTING GLUCOSE: ICD-10-CM

## 2024-06-14 DIAGNOSIS — Z12.11 SCREENING FOR COLON CANCER: ICD-10-CM

## 2024-06-14 DIAGNOSIS — J47.9 BRONCHIECTASIS WITHOUT COMPLICATION: ICD-10-CM

## 2024-06-14 DIAGNOSIS — E78.5 HYPERLIPIDEMIA, UNSPECIFIED HYPERLIPIDEMIA TYPE: Chronic | ICD-10-CM

## 2024-06-14 DIAGNOSIS — R05.9 COUGH, UNSPECIFIED TYPE: ICD-10-CM

## 2024-06-14 LAB
ALBUMIN SERPL BCP-MCNC: 4.3 G/DL (ref 3.5–5.2)
ALP SERPL-CCNC: 53 U/L (ref 55–135)
ALT SERPL W/O P-5'-P-CCNC: 16 U/L (ref 10–44)
ANION GAP SERPL CALC-SCNC: 11 MMOL/L (ref 8–16)
AST SERPL-CCNC: 21 U/L (ref 10–40)
BASOPHILS # BLD AUTO: 0.04 K/UL (ref 0–0.2)
BASOPHILS NFR BLD: 0.7 % (ref 0–1.9)
BILIRUB SERPL-MCNC: 1.2 MG/DL (ref 0.1–1)
BUN SERPL-MCNC: 19 MG/DL (ref 8–23)
CALCIUM SERPL-MCNC: 9.5 MG/DL (ref 8.7–10.5)
CHLORIDE SERPL-SCNC: 99 MMOL/L (ref 95–110)
CHOLEST SERPL-MCNC: 270 MG/DL (ref 120–199)
CHOLEST/HDLC SERPL: 2.8 {RATIO} (ref 2–5)
CO2 SERPL-SCNC: 23 MMOL/L (ref 23–29)
CREAT SERPL-MCNC: 0.7 MG/DL (ref 0.5–1.4)
DIFFERENTIAL METHOD BLD: NORMAL
EOSINOPHIL # BLD AUTO: 0 K/UL (ref 0–0.5)
EOSINOPHIL NFR BLD: 0.7 % (ref 0–8)
ERYTHROCYTE [DISTWIDTH] IN BLOOD BY AUTOMATED COUNT: 13.4 % (ref 11.5–14.5)
EST. GFR  (NO RACE VARIABLE): >60 ML/MIN/1.73 M^2
ESTIMATED AVG GLUCOSE: 114 MG/DL (ref 68–131)
GLUCOSE SERPL-MCNC: 83 MG/DL (ref 70–110)
HBA1C MFR BLD: 5.6 % (ref 4–5.6)
HCT VFR BLD AUTO: 38.3 % (ref 37–48.5)
HCV AB SERPL QL IA: NORMAL
HDLC SERPL-MCNC: 97 MG/DL (ref 40–75)
HDLC SERPL: 35.9 % (ref 20–50)
HGB BLD-MCNC: 13.1 G/DL (ref 12–16)
IMM GRANULOCYTES # BLD AUTO: 0.02 K/UL (ref 0–0.04)
IMM GRANULOCYTES NFR BLD AUTO: 0.3 % (ref 0–0.5)
LDLC SERPL CALC-MCNC: 163.6 MG/DL (ref 63–159)
LYMPHOCYTES # BLD AUTO: 1.8 K/UL (ref 1–4.8)
LYMPHOCYTES NFR BLD: 30.1 % (ref 18–48)
MCH RBC QN AUTO: 30.3 PG (ref 27–31)
MCHC RBC AUTO-ENTMCNC: 34.2 G/DL (ref 32–36)
MCV RBC AUTO: 89 FL (ref 82–98)
MONOCYTES # BLD AUTO: 0.4 K/UL (ref 0.3–1)
MONOCYTES NFR BLD: 6.3 % (ref 4–15)
NEUTROPHILS # BLD AUTO: 3.8 K/UL (ref 1.8–7.7)
NEUTROPHILS NFR BLD: 61.9 % (ref 38–73)
NONHDLC SERPL-MCNC: 173 MG/DL
NRBC BLD-RTO: 0 /100 WBC
PLATELET # BLD AUTO: 184 K/UL (ref 150–450)
PMV BLD AUTO: 12.1 FL (ref 9.2–12.9)
POTASSIUM SERPL-SCNC: 4 MMOL/L (ref 3.5–5.1)
PROT SERPL-MCNC: 7.2 G/DL (ref 6–8.4)
RBC # BLD AUTO: 4.33 M/UL (ref 4–5.4)
SODIUM SERPL-SCNC: 133 MMOL/L (ref 136–145)
TRIGL SERPL-MCNC: 47 MG/DL (ref 30–150)
TSH SERPL DL<=0.005 MIU/L-ACNC: 1.11 UIU/ML (ref 0.4–4)
WBC # BLD AUTO: 6.08 K/UL (ref 3.9–12.7)

## 2024-06-14 PROCEDURE — 84443 ASSAY THYROID STIM HORMONE: CPT

## 2024-06-14 PROCEDURE — 99999 PR PBB SHADOW E&M-EST. PATIENT-LVL IV: CPT | Mod: PBBFAC,,,

## 2024-06-14 PROCEDURE — 85025 COMPLETE CBC W/AUTO DIFF WBC: CPT

## 2024-06-14 PROCEDURE — 36415 COLL VENOUS BLD VENIPUNCTURE: CPT | Mod: PO

## 2024-06-14 PROCEDURE — 99214 OFFICE O/P EST MOD 30 MIN: CPT | Mod: PBBFAC,PO

## 2024-06-14 PROCEDURE — 86803 HEPATITIS C AB TEST: CPT

## 2024-06-14 PROCEDURE — 80061 LIPID PANEL: CPT

## 2024-06-14 PROCEDURE — 83036 HEMOGLOBIN GLYCOSYLATED A1C: CPT

## 2024-06-14 PROCEDURE — 99387 INIT PM E/M NEW PAT 65+ YRS: CPT | Mod: GZ,S$PBB,,

## 2024-06-14 PROCEDURE — 80053 COMPREHEN METABOLIC PANEL: CPT

## 2024-06-14 NOTE — PROGRESS NOTES
74 y.o. female here for annual exam.     Cholesterol: needs  Vaccines: Influenza -UTD ; Tetanus - UTD,next due 2032 ; Prevnar 20 - completed; Zoster - she has been thinking about doing it, going to go to her ; COVID - needs, RSV-states that she is going to get this as well  Sexual Screening: not active  STD screening: no concerns  Eye exam: last exam 10/2023, she is scheduling her exam for this year  Mammogram: needs,last completed 2020, referral  Gyn exam: last exam a couple of years ago, denies hx of abnormal paps, doesn't think she wants to do this anymore.   Colonoscopy:needs  DEXA: UTD, patient has osteoporosis, next due 2026, she is taking Vitamin D at home but not consistently.   A1c: needs    Exercise:  Diet:      Review of Systems   Constitutional:  Negative for chills, fever, malaise/fatigue and weight loss.   HENT:  Negative for hearing loss.    Eyes:  Negative for discharge.   Respiratory:  Positive for cough. Negative for shortness of breath and wheezing.    Cardiovascular:  Negative for chest pain, palpitations and leg swelling.   Gastrointestinal:  Negative for abdominal pain, blood in stool, constipation, diarrhea, nausea and vomiting.   Genitourinary:  Negative for dysuria, frequency, hematuria and urgency.   Musculoskeletal:  Negative for falls, joint pain and myalgias.   Neurological:  Negative for dizziness, weakness and headaches.   Psychiatric/Behavioral:  Negative for depression. The patient is not nervous/anxious and does not have insomnia.       Physical Exam  Constitutional:       General: She is not in acute distress.     Appearance: Normal appearance. She is normal weight. She is not ill-appearing or toxic-appearing.   HENT:      Head: Normocephalic and atraumatic.      Right Ear: Tympanic membrane, ear canal and external ear normal.      Left Ear: Tympanic membrane, ear canal and external ear normal.      Nose: Nose normal.      Mouth/Throat:      Mouth: Mucous membranes are moist.       Pharynx: Oropharynx is clear. No oropharyngeal exudate.   Eyes:      Extraocular Movements: Extraocular movements intact.      Conjunctiva/sclera: Conjunctivae normal.      Pupils: Pupils are equal, round, and reactive to light.   Cardiovascular:      Rate and Rhythm: Normal rate and regular rhythm.      Pulses: Normal pulses.      Heart sounds: Normal heart sounds. No murmur heard.     No friction rub. No gallop.   Pulmonary:      Effort: Pulmonary effort is normal. No respiratory distress.      Breath sounds: Examination of the right-upper field reveals rales. Examination of the left-upper field reveals rales. Rales present. No wheezing.   Abdominal:      General: Bowel sounds are normal. There is no distension.      Palpations: Abdomen is soft. There is no mass.      Tenderness: There is no abdominal tenderness. There is no guarding.   Musculoskeletal:         General: No swelling or deformity. Normal range of motion.      Cervical back: Normal range of motion and neck supple.   Skin:     General: Skin is warm and dry.      Capillary Refill: Capillary refill takes less than 2 seconds.   Neurological:      General: No focal deficit present.      Mental Status: She is alert and oriented to person, place, and time. Mental status is at baseline.      GCS: GCS eye subscore is 4. GCS verbal subscore is 5. GCS motor subscore is 6.      Motor: No weakness.      Gait: Gait normal.   Psychiatric:         Behavior: Behavior normal.          Past Medical History:   Diagnosis Date    Hyperlipidemia     Hypothyroidism     Thyroid disease      Past Surgical History:   Procedure Laterality Date    cataract       EYE SURGERY  2018    cataracts     Social History     Socioeconomic History    Marital status:    Tobacco Use    Smoking status: Former     Current packs/day: 0.00     Types: Cigarettes     Start date: 3/20/1974     Quit date: 1979     Years since quittin.7     Passive exposure: Never    Smokeless  tobacco: Never    Tobacco comments:     social smoker, less than a pack a week   Substance and Sexual Activity    Alcohol use: Yes     Alcohol/week: 2.0 standard drinks of alcohol     Types: 2 Glasses of wine per week     Comment: glass of wine 1-2 times a week    Drug use: Never    Sexual activity: Not Currently     Partners: Male     Social Determinants of Health     Financial Resource Strain: Low Risk  (5/29/2024)    Overall Financial Resource Strain (CARDIA)     Difficulty of Paying Living Expenses: Not very hard   Food Insecurity: No Food Insecurity (5/29/2024)    Hunger Vital Sign     Worried About Running Out of Food in the Last Year: Never true     Ran Out of Food in the Last Year: Never true   Transportation Needs: No Transportation Needs (4/2/2022)    PRAPARE - Transportation     Lack of Transportation (Medical): No     Lack of Transportation (Non-Medical): No   Physical Activity: Sufficiently Active (5/29/2024)    Exercise Vital Sign     Days of Exercise per Week: 5 days     Minutes of Exercise per Session: 30 min   Stress: Stress Concern Present (5/29/2024)    [employer] - Occupational Stress Questionnaire     Feeling of Stress : To some extent   Housing Stability: Low Risk  (4/2/2022)    Housing Stability Vital Sign     Unable to Pay for Housing in the Last Year: No     Number of Places Lived in the Last Year: 1     Unstable Housing in the Last Year: No     Review of patient's allergies indicates:  No Known Allergies  Danielle Longo had no medications administered during this visit.        Danielle was seen today for annual exam.    Diagnoses and all orders for this visit:    Annual physical exam    Bronchiectasis without complication  -     COMPREHENSIVE METABOLIC PANEL; Future  -     CBC W/ AUTO DIFFERENTIAL; Future    Cough, unspecified type    Hyperlipidemia, unspecified hyperlipidemia type  -     LIPID PANEL; Future    Osteoporosis, unspecified osteoporosis type,  unspecified pathological fracture presence    Other specified hypothyroidism  -     TSH; Future    Encounter for screening mammogram for breast cancer  -     Mammo Digital Screening Bilat w/ Mega; Future    Screening for colon cancer  -     Fecal Immunochemical Test (iFOBT); Future    Encounter for hepatitis C screening test for low risk patient  -     HEPATITIS C ANTIBODY; Future    Impaired fasting glucose  -     HEMOGLOBIN A1C; Future        Check CMP, CBC, TSH, lipid panel, and hemoglobin A1c.  Discussed diet and exercise.  Discussed vaccines.  Chronic, stable.  Patient is followed by pulmonology.  Please continue albuterol and Acapella treatments.  Chronic, stable.  Patient is followed by pulmonology.  Please continue albuterol and Acapella treatments.  Chronic, stable.  Encouraged patient to maintain a healthy diet such as Mediterranean diet.  Discussed regular exercise at least 30 minutes per day 5 days a week.  Reviewed DEXA scan results.  Continue home use of daily vitamin-D supplements.  Discussed Fosamax administration, patient is undecided at this time she would like to take this medication.  Chronic, stable.  Continue taking Synthroid 50 mcg daily.   Referral for mammogram placed.  Packet provided.  Check hepatitis C antibody today.  Check hemoglobin A1c.           Pepe Ordaz,MSN, APRN, FNP-C  [facility], Internal Medicine  2:15 PM

## 2024-09-11 ENCOUNTER — TELEPHONE (OUTPATIENT)
Dept: PRIMARY CARE CLINIC | Facility: CLINIC | Age: 74
End: 2024-09-11
Payer: MEDICARE

## 2024-11-22 NOTE — PROGRESS NOTES
Subjective:       Patient ID: Danielle Longo is a 74 y.o. female.    Chief Complaint: No chief complaint on file.    HPI:   Danielle Longo is a 74 y.o. female last seen by me 5/31/24 who presents for follow up.    Today:  Denies any URI/LRTIs since her last visit. Denies worsening cough or sputum production. Denies f/c/ns. Weight is stable. AFB positive again. CT Chest improved.    Airway clearance regimen is 1x/day. Remains productive of light yellow mucus.    Uses albuterol daily to facilitate sputum production.    5/31/24 HPI:  Energy and weight are stable. Respiratory symptoms unchanged. AFB on 11/16/23     Not able to exercise as much but does note temporary improvement in her dyspnea when she walked several days in a row.    Denies any URI/LRTIs.     12/7/23 HPI;  Started her airway clearance several weeks ago and reports productive sputum.  Appetite has significantly improved. Energy is also improved.     Exercising daily walking 2-3 miles/day.    Denies f/c/ns, malaise, fatigue.    AFB sent last month is smear neg and NGTD.    Initial Eval 10/25/23  Underwent a CT Chest recently after an abnormal CXR with symptoms of dyspnea, sinus sx, earache, f/c/ns, malaise and a cough that started in July. Other symptoms improved apart from cough and fatigue. Cough was productive and is now dry. Denies nighttime symptoms. Lost weight from 110 to 102 during the illness which she has gained back.     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     Wal[place] daily in the park.     + Recurrent sinusitis    Denies chest pain, orthopnea, PND, LE edema, palpitations, syncope/presyncope    Denies GERD sx, globus, dysphagia    Denies f/c/ns    Denies rashes, myalgias, arthralgias, hair/nail changes, eye changes, raynauds, mucosal ulcerations        Exposures:  Work- Office work  Tobacco- quit in 1979  Inhalational Agents- Denies  Mold- No current mold. Did have mold after Alexandrea that was removed  Pets-  "Cats  Birds- Denies  Hot tubs- Denies    Family History of Lung Disease: Denies  Childhood History of Lung Disease: Denies    Review of Systems    As above    Social History     Tobacco Use    Smoking status: Former     Current packs/day: 0.00     Types: Cigarettes     Start date: 3/20/1974     Quit date: 1979     Years since quittin.3     Passive exposure: Never    Smokeless tobacco: Never    Tobacco comments:     social smoker, less than a pack a week   Substance Use Topics    Alcohol use: Yes     Alcohol/week: 2.0 standard drinks of alcohol     Types: 2 Glasses of wine per week     Comment: glass of wine 1-2 times a week       Review of patient's allergies indicates:  No Known Allergies  Past Medical History:   Diagnosis Date    Hyperlipidemia     Hypothyroidism     Thyroid disease      Past Surgical History:   Procedure Laterality Date    cataract       EYE SURGERY  2018    cataracts     Current Outpatient Medications on File Prior to Visit   Medication Sig    mucus clearing device (ACAPELLA, FLUTTER) by Misc.(Non-Drug; Combo Route) route 2 (two) times a day.     No current facility-administered medications on file prior to visit.       Objective:      Vitals:    24 0853   BP: 124/82   BP Location: Left arm   Patient Position: Sitting   Pulse: 74   SpO2: 97%   Weight: 49.4 kg (108 lb 14.5 oz)   Height: 5' 1" (1.549 m)           Physical Exam   Constitutional: She appears well-developed and well-nourished.   HENT:   Nose: No mucosal edema.   Mouth/Throat: Oropharynx is clear and moist. Mallampati Score: I.   Neck: No tracheal deviation present.   Cardiovascular: Normal rate and regular rhythm.   Pulmonary/Chest: Normal expansion, symmetric chest wall expansion and effort normal. No respiratory distress. She has no wheezes. She has no rhonchi. She has no rales.   Abdominal: She exhibits no distension.   Musculoskeletal:         General: No edema.      Cervical back: Neck supple.   Lymphadenopathy: " No supraclavicular adenopathy is present.     She has no cervical adenopathy.   Skin: Skin is warm and dry. No cyanosis or erythema. Nails show no clubbing.   Nursing note and vitals reviewed.      Personal Diagnostic Review    Laboratory:  11/2023   AFB + M chelonae    5/22/23  Bicarb- 27  Eosinophils- 0.0    AFB   06/10/24- + Mycobacterium chelonae   11/16/23- + Mycobacterium chelonae     CT Chest 6/4/24- Images personally reviewed. I agree w/ the radiologist who notes;  1. Interval improvement of the bilateral consolidation and tree-in-bud nodules.  Persistent consolidation with bronchiectasis in the right middle lobe and lingula may represent atypical mycobacterial infectious process.  2. Grossly stable 7 mm nodule in the left upper lobe.  Consider CT follow-up in 6-12 months.  3. Additional findings.  Please see the above discussion.      CT Chest 12/4/23- Images personally reviewed. I agree w/ the radiologist who notes;  1. Compared to CT chest dated 09/07/2023: Findings remain consistent with atypical mycobacterial infection with interval mild improvement of superimposed infectious/inflammatory process in the lungs.  2. Stable left upper lobe perifissural nodule measuring 8 x 5 mm.  Consider follow-up by CT chest in 6-12 months.  3. Additional findings, as above.    CT Chest 9/7/23- Images personally reviewed. I agree w/ the radiologist who notes;  1. Bilateral consolidative changes in the right middle lobe and lingula with bronchiectasis/bronchiolectasis associated with bilateral patchy confluent tree in bud and irregular opacities as described above.  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  2. Incidental findings, as above.    CXR 9/6/23- Images personally reviewed. I agree w/ the radiologist who notes;  The trachea is unremarkable.  There is central bronchiectasis.  The cardiomediastinal silhouette is within normal limits.  There is no evidence of free air beneath the  hemidiaphragms.  There are no pleural effusions.  There is no evidence of a pneumothorax.  There is no evidence of pneumomediastinum.  There are patchy reticulonodular densities throughout the right hemithorax.  There also scattered fibrotic changes.  There is no focal consolidation.     There are degenerative changes in the osseous structures.  There is levoconvex scoliosis of the thoracolumbar alignment.  There is no evidence of a fracture.     Additional evaluation with noncontrast CT scan of the chest, as clinically warranted.    PFTs   5/31/24  FVC: 2.00 (81.1 % predicted), FEV1: 1.40 (73.2 % predicted), FEV1/FVC:  70, TLC: 3.52 (79.3 % predicted), DLCO: 9.93 (52.3 % predicted)    Assessment:     Problem List Items Addressed This Visit          Pulmonary    Bronchiectasis without complication - Primary     JULIET borderline elevated, Immunoglobulins unremarkable and RF neg. + afb w/ m chelonae.      Cont airway clearance and repeat afb     - F/u afb  - Cont airway clearance regimen 1-2 times per day              - 3% hypertonic saline neb              - Followed by albuterol neb              - Followed by 10 breaths with an acapella  - Avoid aerosolizing water  - Encouraged regular, progressive exercise   - PFTs at next visit            Relevant Medications    albuterol (PROAIR HFA) 90 mcg/actuation inhaler    Other Relevant Orders    AFB Culture & Smear       ID    Nontuberculous mycobacterial pulmonary disease     M chelonea on afb 11/2023 and 6/10/24. Unclear if colonizer vs infection. Repeat afb today. Weight and energy are stable. Using airway clearance. Plan per bronchiectasis            Relevant Orders    AFB Culture & Smear    Lung Volumes    DLCO-Carbon Monoxide Diffusing Capacity    Spirometry without Bronchodilator       Other    Abnormal chest CT     Follow up CT Chest 6/2025 for the pulmonary nodule          Other Visit Diagnoses       Chronic cough        Relevant Medications    albuterol (PROAIR  HFA) 90 mcg/actuation inhaler    Other Relevant Orders    Lung Volumes    DLCO-Carbon Monoxide Diffusing Capacity    Spirometry without Bronchodilator

## 2024-11-25 ENCOUNTER — OFFICE VISIT (OUTPATIENT)
Dept: PULMONOLOGY | Facility: CLINIC | Age: 74
End: 2024-11-25
Payer: MEDICARE

## 2024-11-25 VITALS
HEART RATE: 74 BPM | WEIGHT: 108.94 LBS | BODY MASS INDEX: 20.57 KG/M2 | DIASTOLIC BLOOD PRESSURE: 82 MMHG | HEIGHT: 61 IN | OXYGEN SATURATION: 97 % | SYSTOLIC BLOOD PRESSURE: 124 MMHG

## 2024-11-25 DIAGNOSIS — A31.0 NONTUBERCULOUS MYCOBACTERIAL PULMONARY DISEASE: ICD-10-CM

## 2024-11-25 DIAGNOSIS — J47.9 BRONCHIECTASIS WITHOUT COMPLICATION: Primary | ICD-10-CM

## 2024-11-25 DIAGNOSIS — R05.3 CHRONIC COUGH: ICD-10-CM

## 2024-11-25 DIAGNOSIS — R93.89 ABNORMAL CHEST CT: ICD-10-CM

## 2024-11-25 PROCEDURE — 99213 OFFICE O/P EST LOW 20 MIN: CPT | Mod: S$PBB,,, | Performed by: INTERNAL MEDICINE

## 2024-11-25 PROCEDURE — 99999 PR PBB SHADOW E&M-EST. PATIENT-LVL III: CPT | Mod: PBBFAC,,, | Performed by: INTERNAL MEDICINE

## 2024-11-25 PROCEDURE — 99213 OFFICE O/P EST LOW 20 MIN: CPT | Mod: PBBFAC | Performed by: INTERNAL MEDICINE

## 2024-11-25 RX ORDER — ALBUTEROL SULFATE 90 UG/1
2 INHALANT RESPIRATORY (INHALATION) EVERY 6 HOURS PRN
Qty: 54 G | Refills: 3 | Status: SHIPPED | OUTPATIENT
Start: 2024-11-25

## 2024-12-02 ENCOUNTER — PATIENT MESSAGE (OUTPATIENT)
Dept: PULMONOLOGY | Facility: CLINIC | Age: 74
End: 2024-12-02
Payer: MEDICARE

## 2024-12-02 DIAGNOSIS — J47.9 BRONCHIECTASIS WITHOUT COMPLICATION: ICD-10-CM

## 2024-12-03 RX ORDER — SODIUM CHLORIDE FOR INHALATION 3 %
VIAL, NEBULIZER (ML) INHALATION 2 TIMES DAILY
Qty: 240 ML | Refills: 5 | Status: SHIPPED | OUTPATIENT
Start: 2024-12-03

## 2024-12-09 ENCOUNTER — TELEPHONE (OUTPATIENT)
Dept: PULMONOLOGY | Facility: CLINIC | Age: 74
End: 2024-12-09
Payer: MEDICARE

## 2024-12-09 RX ORDER — ALBUTEROL SULFATE 1.25 MG/3ML
1.25 SOLUTION RESPIRATORY (INHALATION) DAILY
Qty: 225 ML | Refills: 3 | Status: SHIPPED | OUTPATIENT
Start: 2024-12-09 | End: 2025-12-09

## 2024-12-09 NOTE — TELEPHONE ENCOUNTER
----- Message from Tech Jayahairamine sent at 12/9/2024  3:47 PM CST -----  Regarding: refill  Contact: [phone]  Type:  RX Refill Request    Who Called: pt   Refill or New Rx:albuterol (PROVENTIL) 2.5 mg /3 mL (0.083 %) nebulizer solution    Preferred Pharmacy with phone number:      [facility] - [address] [address]  Phone: [phone] Fax: [phone]    Would the patient rather a call back or a response via [facility]? call  Best Call Back Number:[phone]  Additional Information: pt states she was prescribed two different nebulizer solutions

## 2024-12-13 RX ORDER — LEVOTHYROXINE SODIUM 50 UG/1
50 TABLET ORAL
Qty: 90 TABLET | Refills: 1 | Status: SHIPPED | OUTPATIENT
Start: 2024-12-13

## 2024-12-13 NOTE — TELEPHONE ENCOUNTER
Refill Routing Note   Medication(s) are not appropriate for processing by [facility] for the following reason(s):        Patient not seen by provider within 15 months    [facility] action(s):  Defer               Appointments  past 12m or future 3m with PCP    Date Provider   Last Visit   7/26/2023 Pepe Peterson MD   Next Visit   Visit date not found Pepe Peterson MD   ED visits in past 90 days: 0        Note composed:9:10 AM 12/13/2024

## 2024-12-13 NOTE — TELEPHONE ENCOUNTER
No care due was identified.  [facility] Embedded Care Due Messages. Reference number: [id number].   12/13/2024 1:09:23 AM CST

## 2025-02-04 NOTE — ASSESSMENT & PLAN NOTE
M chelonea on afb 11/2023 and 6/10/24. Unclear if colonizer vs infection. Repeat afb today. Weight and energy are stable. Using airway clearance. Plan per bronchiectasis

## 2025-02-04 NOTE — ASSESSMENT & PLAN NOTE
JULIET borderline elevated, Immunoglobulins unremarkable and RF neg. + afb w/ m chelonae.      Cont airway clearance and repeat afb     - F/u afb  - Cont airway clearance regimen 1-2 times per day              - 3% hypertonic saline neb              - Followed by albuterol neb              - Followed by 10 breaths with an acapella  - Avoid aerosolizing water  - Encouraged regular, progressive exercise   - PFTs at next visit

## 2025-02-22 DIAGNOSIS — Z00.00 ENCOUNTER FOR MEDICARE ANNUAL WELLNESS EXAM: ICD-10-CM

## 2025-03-24 ENCOUNTER — PATIENT MESSAGE (OUTPATIENT)
Dept: INTERNAL MEDICINE | Facility: CLINIC | Age: 75
End: 2025-03-24
Payer: MEDICARE

## 2025-04-02 ENCOUNTER — RESULTS FOLLOW-UP (OUTPATIENT)
Dept: INTERNAL MEDICINE | Facility: CLINIC | Age: 75
End: 2025-04-02

## 2025-04-02 ENCOUNTER — OFFICE VISIT (OUTPATIENT)
Dept: INTERNAL MEDICINE | Facility: CLINIC | Age: 75
End: 2025-04-02
Payer: MEDICARE

## 2025-04-02 ENCOUNTER — LAB VISIT (OUTPATIENT)
Dept: LAB | Facility: HOSPITAL | Age: 75
End: 2025-04-02
Attending: INTERNAL MEDICINE
Payer: MEDICARE

## 2025-04-02 VITALS
HEART RATE: 75 BPM | SYSTOLIC BLOOD PRESSURE: 132 MMHG | HEIGHT: 61 IN | TEMPERATURE: 97 F | BODY MASS INDEX: 20.71 KG/M2 | DIASTOLIC BLOOD PRESSURE: 84 MMHG | WEIGHT: 109.69 LBS | OXYGEN SATURATION: 96 %

## 2025-04-02 DIAGNOSIS — I70.0 AORTIC ATHEROSCLEROSIS: ICD-10-CM

## 2025-04-02 DIAGNOSIS — E03.8 OTHER SPECIFIED HYPOTHYROIDISM: Chronic | ICD-10-CM

## 2025-04-02 DIAGNOSIS — F43.9 STRESS: ICD-10-CM

## 2025-04-02 DIAGNOSIS — E78.5 HYPERLIPIDEMIA, UNSPECIFIED HYPERLIPIDEMIA TYPE: ICD-10-CM

## 2025-04-02 DIAGNOSIS — G47.00 INSOMNIA, UNSPECIFIED TYPE: ICD-10-CM

## 2025-04-02 DIAGNOSIS — E78.5 HYPERLIPIDEMIA, UNSPECIFIED HYPERLIPIDEMIA TYPE: Primary | Chronic | ICD-10-CM

## 2025-04-02 DIAGNOSIS — Z12.11 ENCOUNTER FOR COLORECTAL CANCER SCREENING: ICD-10-CM

## 2025-04-02 DIAGNOSIS — B00.1 FEVER BLISTER: ICD-10-CM

## 2025-04-02 DIAGNOSIS — Z12.12 ENCOUNTER FOR COLORECTAL CANCER SCREENING: ICD-10-CM

## 2025-04-02 DIAGNOSIS — J47.9 BRONCHIECTASIS WITHOUT COMPLICATION: ICD-10-CM

## 2025-04-02 LAB
ABSOLUTE EOSINOPHIL (OHS): 0.02 K/UL
ABSOLUTE MONOCYTE (OHS): 0.36 K/UL (ref 0.3–1)
ABSOLUTE NEUTROPHIL COUNT (OHS): 2.14 K/UL (ref 1.8–7.7)
ALBUMIN SERPL BCP-MCNC: 4.2 G/DL (ref 3.5–5.2)
ALP SERPL-CCNC: 65 UNIT/L (ref 40–150)
ALT SERPL W/O P-5'-P-CCNC: 14 UNIT/L (ref 10–44)
ANION GAP (OHS): 10 MMOL/L (ref 8–16)
AST SERPL-CCNC: 20 UNIT/L (ref 11–45)
BASOPHILS # BLD AUTO: 0.05 K/UL
BASOPHILS NFR BLD AUTO: 1.3 %
BILIRUB SERPL-MCNC: 1.2 MG/DL (ref 0.1–1)
BUN SERPL-MCNC: 14 MG/DL (ref 8–23)
CALCIUM SERPL-MCNC: 9.2 MG/DL (ref 8.7–10.5)
CHLORIDE SERPL-SCNC: 99 MMOL/L (ref 95–110)
CHOLEST SERPL-MCNC: 271 MG/DL (ref 120–199)
CHOLEST/HDLC SERPL: 2.5 {RATIO} (ref 2–5)
CO2 SERPL-SCNC: 24 MMOL/L (ref 23–29)
CREAT SERPL-MCNC: 0.7 MG/DL (ref 0.5–1.4)
ERYTHROCYTE [DISTWIDTH] IN BLOOD BY AUTOMATED COUNT: 12.9 % (ref 11.5–14.5)
GFR SERPLBLD CREATININE-BSD FMLA CKD-EPI: >60 ML/MIN/1.73/M2
GLUCOSE SERPL-MCNC: 90 MG/DL (ref 70–110)
HCT VFR BLD AUTO: 40.8 % (ref 37–48.5)
HDLC SERPL-MCNC: 110 MG/DL (ref 40–75)
HDLC SERPL: 40.6 % (ref 20–50)
HGB BLD-MCNC: 13.7 GM/DL (ref 12–16)
IMM GRANULOCYTES # BLD AUTO: 0.01 K/UL (ref 0–0.04)
IMM GRANULOCYTES NFR BLD AUTO: 0.3 % (ref 0–0.5)
LDLC SERPL CALC-MCNC: 153.4 MG/DL (ref 63–159)
LYMPHOCYTES # BLD AUTO: 1.28 K/UL (ref 1–4.8)
MCH RBC QN AUTO: 29.5 PG (ref 27–31)
MCHC RBC AUTO-ENTMCNC: 33.6 G/DL (ref 32–36)
MCV RBC AUTO: 88 FL (ref 82–98)
NONHDLC SERPL-MCNC: 161 MG/DL
NUCLEATED RBC (/100WBC) (OHS): 0 /100 WBC
PLATELET # BLD AUTO: 201 K/UL (ref 150–450)
PMV BLD AUTO: 11.6 FL (ref 9.2–12.9)
POTASSIUM SERPL-SCNC: 4.4 MMOL/L (ref 3.5–5.1)
PROT SERPL-MCNC: 7.4 GM/DL (ref 6–8.4)
RBC # BLD AUTO: 4.65 M/UL (ref 4–5.4)
RELATIVE EOSINOPHIL (OHS): 0.5 %
RELATIVE LYMPHOCYTE (OHS): 33.2 % (ref 18–48)
RELATIVE MONOCYTE (OHS): 9.3 % (ref 4–15)
RELATIVE NEUTROPHIL (OHS): 55.4 % (ref 38–73)
SODIUM SERPL-SCNC: 133 MMOL/L (ref 136–145)
TRIGL SERPL-MCNC: 38 MG/DL (ref 30–150)
TSH SERPL-ACNC: 1.51 UIU/ML (ref 0.4–4)
WBC # BLD AUTO: 3.86 K/UL (ref 3.9–12.7)

## 2025-04-02 PROCEDURE — 36415 COLL VENOUS BLD VENIPUNCTURE: CPT | Mod: PO

## 2025-04-02 PROCEDURE — 84443 ASSAY THYROID STIM HORMONE: CPT

## 2025-04-02 PROCEDURE — 99999 PR PBB SHADOW E&M-EST. PATIENT-LVL IV: CPT | Mod: PBBFAC,,, | Performed by: INTERNAL MEDICINE

## 2025-04-02 PROCEDURE — 85025 COMPLETE CBC W/AUTO DIFF WBC: CPT

## 2025-04-02 PROCEDURE — 80053 COMPREHEN METABOLIC PANEL: CPT

## 2025-04-02 PROCEDURE — 82465 ASSAY BLD/SERUM CHOLESTEROL: CPT

## 2025-04-02 PROCEDURE — 99214 OFFICE O/P EST MOD 30 MIN: CPT | Mod: PBBFAC,PO | Performed by: INTERNAL MEDICINE

## 2025-04-02 PROCEDURE — 99214 OFFICE O/P EST MOD 30 MIN: CPT | Mod: S$PBB,,, | Performed by: INTERNAL MEDICINE

## 2025-04-02 RX ORDER — VALACYCLOVIR HYDROCHLORIDE 1 G/1
1000 TABLET, FILM COATED ORAL 2 TIMES DAILY
Qty: 10 TABLET | Refills: 11 | Status: SHIPPED | OUTPATIENT
Start: 2025-04-02 | End: 2026-04-02

## 2025-04-02 RX ORDER — ROSUVASTATIN CALCIUM 10 MG/1
10 TABLET, COATED ORAL DAILY
Qty: 90 TABLET | Refills: 3 | Status: SHIPPED | OUTPATIENT
Start: 2025-04-02 | End: 2026-04-02

## 2025-04-02 RX ORDER — VALACYCLOVIR HYDROCHLORIDE 1 G/1
1000 TABLET, FILM COATED ORAL 2 TIMES DAILY
Qty: 10 TABLET | Refills: 11 | Status: SHIPPED | OUTPATIENT
Start: 2025-04-02 | End: 2025-04-02 | Stop reason: SDUPTHER

## 2025-04-02 NOTE — PROGRESS NOTES
Assessment:       1. Hyperlipidemia, unspecified hyperlipidemia type  - CBC Auto Differential; Future  - Comprehensive Metabolic Panel; Future  - TSH; Future  - Lipid Panel; Future  - rosuvastatin (CRESTOR) 10 MG tablet; Take 1 tablet (10 mg total) by mouth once daily.  Dispense: 90 tablet; Refill: 3    2. Aortic atherosclerosis  - rosuvastatin (CRESTOR) 10 MG tablet; Take 1 tablet (10 mg total) by mouth once daily.  Dispense: 90 tablet; Refill: 3    3. Other specified hypothyroidism    4. Bronchiectasis without complication    5. Fever blister  - valACYclovir (VALTREX) 1000 MG tablet; Take 1 tablet (1,000 mg total) by mouth 2 (two) times daily.  Dispense: 10 tablet; Refill: 11    6. Stress  - Ambulatory referral/consult to Psychology; Future    7. Insomnia, unspecified type    8. Encounter for colorectal cancer screening  - Cologuard Screening (Multitarget Stool DNA); Future  - Cologuard Screening (Multitarget Stool DNA)        Plan:       1/2.  Continue to monitor.    3.  Continue Synthroid 50 mcg.   4. Continue albuterol as needed.  5. Valtrex 1000 mg twice a day prescribed.    6. Refer to Psychology.  Number Friday.    7. Monitor.    8. Refer for Cologuard.    Deep Scribe:  IMPRESSION:  1. Assessed HLD and aortic atherosclerosis, noting high risk for stroke and heart attack based on recent lipid panel (total cholesterol 270, triglycerides 47, HDL 97, ).  2. Determined need for statin therapy to lower cholesterol and reduce cardiovascular risk.  3. Evaluated potential side effects of statin therapy, noting low incidence of muscle aches based on clinical trials.  4. Discussed genetic component of HLD and its relation to family history of cardiovascular disease.  5. Assessed oral lesions, suspecting possible stress-related etiology.  6. Considered sleep disturbances as potential contributor to fatigue.    SUMMARY:   Prescribed Valtrex for potential herpes simplex virus lesions   Prescribed Crestor 10 mg  daily, to be taken before bedtime   Ordered comprehensive blood panel including blood counts, electrolytes, kidney and liver function, blood sugar, thyroid, and cholesterol   Ordered Cologuard test for colon cancer screening   Continued albuterol as needed for bronchiectasis management   Continued Synthroid 50 mcg daily for hypothyroidism management   Recommend Coenzyme Q10 (OTC) daily if concerned about side effects   Referred patient to psychotherapy for anxiety and stress management   Follow-up in 3 months for repeat labs to assess response and discuss target LDL levels    BRONCHIECTASIS WITHOUT COMPLICATION:   Continued albuterol as needed for bronchiectasis management.   Evaluated its potential effects on the oral cavity.   Instructed patient to monitor symptoms and use of the inhaler.    GENERALIZED ANXIETY DISORDER:   Assessed anxiety symptoms, including sleep disturbances and racing thoughts related to various stressors.   Provided sleep hygiene advice and referred patient to psychotherapy for anxiety and stress management.   Recommend 7-8 hours of sleep per night, instructing patient to leave bedroom and engage in calming activities when waking up at night.   Advised monitoring anxiety symptoms and effectiveness of sleep hygiene techniques.   Suggested considering alternative sleeping arrangements for cats to avoid disruptions.    HYPOTHYROIDISM:   Continued Synthroid 50 mcg daily for hypothyroidism management.   Educated patient on proper administration: take on an empty stomach with water, wait 30 minutes before consuming food or coffee.   Assessed compliance and instructed patient to monitor for any changes in hypothyroid symptoms.   Planned to check thyroid function as part of routine lab work.    HYPERLIPIDEMIA:   Evaluated lipid panel results: total cholesterol 270, triglycerides 47, HDL 97, .   Assessed patient as high risk for stroke and heart attack based on results and presence of aortic  atherosclerosis.   Prescribed Crestor 10 mg daily, to be taken before bedtime.   Explained statin mechanism in lowering cholesterol and smoothing arterial plaques.   Recommend Coenzyme Q10 (OTC) daily if concerned about side effects.   Instructed patient to report any unexplained muscle aches.   Scheduled 3-month follow-up for repeat labs to assess response and discussed target LDL levels for reducing cardiovascular risk.    ORAL LESIONS:   Evaluated oral symptoms: very dry lips, red bumps on tongue, and recent mouth ulcers.   Examined painful lip lesion with small bumps when pressed.   Considered possibility of fever blister and discussed potential causes, including stress.   Prescribed Valtrex for treatment of potential herpes simplex virus lesions.   Instructed patient to monitor symptoms and report any changes or lack of improvement.    FAMILY HISTORY OF CARDIOVASCULAR DISEASE:   Noted family hist  ory of high blood pressure and heart attack in father, and high blood pressure in siblings.   Acknowledged genetic component of patient's cardiovascular risk factors.   Recommend statin therapy (Crestor 10 mg) to manage cholesterol and reduce cardiovascular risk.   Advised regular blood pressure monitoring and reporting of any concerns.    FOLLOW-UP AND SCREENING:   Ordered comprehensive blood panel including blood counts, electrolytes, kidney and liver function, blood sugar, thyroid, and cholesterol.   Ordered Cologuard test for colon cancer screening.                 This note was generated with the assistance of ambient listening technology. Verbal consent was obtained by the patient and accompanying visitor(s) for the recording of patient appointment to facilitate this note. I attest to having reviewed and edited the generated note for accuracy, though some syntax or spelling errors may persist. Please contact the author of this note for any clarification.       Subjective:       Patient ID: Danielle Longo is a 74  y.o. female.    Chief Complaint: Follow-up    HPI    74-year-old female here for follow-up of chronic medical conditions.    Patient has hyperlipidemia and aortic atherosclerosis on no current medication.    Patient has hypothyroid on Synthroid 50 mcg.    Patient has bronchiectasis on albuterol as needed.    History of Present Illness    CHIEF COMPLAINT:  Ms. Longo presents today for follow up of chronic medical conditions.    ANXIETY AND SLEEP:  She reports experiencing anxiety about various aspects of life, including global events and family concerns, specifically her brother's health issues, medications, and surgeries. She gets 5-6 hours of sleep per night and denies difficulty falling asleep but wakes up multiple times throughout the night. She wakes up at 4am and is unable to fall back asleep, attributing this to anxiety and overthinking.    ENDOCRINE:  She takes Synthroid 50 mcg daily for hypothyroidism, taken first thing in the morning on an empty stomach without any reported problems.    CARDIOVASCULAR:  Recent labs from summer showed elevated lipids with total cholesterol 270 mg/dL, triglycerides 47 mg/dL, HDL 97 mg/dL, and  mg/dL. Family history is significant for cardiovascular disease, with sister having hypertension and father having both hypertension and history of myocardial infarction.    RESPIRATORY:  She has bronchiectasis managed with albuterol as needed.    ORAL SYMPTOMS:  She reports very dry lips requiring frequent Vaseline application and painful red bumps with associated tenderness to palpation on her lip. She has experienced mouth ulcers and red bumps on the anterior portion of her tongue over the past month.    EXERCISE:  She walks a few miles in the park at least five days per week to increase her energy levels.      ROS:  Constitutional: +fatigue, +sleep disturbances  ENT: +mouth lesions, +mouth pain  Neurological: +difficulty staying asleep, +sleep difficulty  Psychiatric:  +anxiety  Endocrine: +excessive thirst         Review of Systems   Constitutional:  Positive for activity change.   HENT:  Negative for hearing loss and trouble swallowing.    Eyes:  Negative for discharge.   Respiratory:  Negative for chest tightness and wheezing.    Cardiovascular:  Negative for chest pain and palpitations.   Gastrointestinal:  Negative for constipation, diarrhea and vomiting.   Endocrine: Negative for polyuria.   Genitourinary:  Negative for difficulty urinating and hematuria.   Neurological:  Negative for headaches.   Psychiatric/Behavioral:  Negative for dysphoric mood.              Objective:      Physical Exam  Vitals reviewed.   Constitutional:       Appearance: She is well-developed.   HENT:      Head: Normocephalic and atraumatic.      Mouth/Throat:      Pharynx: No oropharyngeal exudate.   Eyes:      General: No scleral icterus.        Right eye: No discharge.         Left eye: No discharge.      Pupils: Pupils are equal, round, and reactive to light.   Neck:      Thyroid: No thyromegaly.      Trachea: No tracheal deviation.   Cardiovascular:      Rate and Rhythm: Normal rate and regular rhythm.      Heart sounds: Normal heart sounds. No murmur heard.     No friction rub. No gallop.   Pulmonary:      Effort: Pulmonary effort is normal. No respiratory distress.      Breath sounds: Normal breath sounds. No wheezing or rales.   Chest:      Chest wall: No tenderness.   Abdominal:      General: Bowel sounds are normal. There is no distension.      Palpations: Abdomen is soft. There is no mass.      Tenderness: There is no abdominal tenderness. There is no guarding or rebound.   Musculoskeletal:         General: No tenderness. Normal range of motion.      Cervical back: Normal range of motion and neck supple.   Skin:     General: Skin is warm and dry.      Coloration: Skin is not pale.      Findings: No erythema or rash.   Neurological:      Mental Status: She is alert and oriented to person,  place, and time.   Psychiatric:         Behavior: Behavior normal.

## 2025-04-29 ENCOUNTER — PATIENT MESSAGE (OUTPATIENT)
Dept: INTERNAL MEDICINE | Facility: CLINIC | Age: 75
End: 2025-04-29
Payer: MEDICARE

## 2025-04-29 DIAGNOSIS — B00.1 FEVER BLISTER: ICD-10-CM

## 2025-04-29 NOTE — TELEPHONE ENCOUNTER
LOV with Pepe Peterson MD , 4/2/2025    Patient states fever blister is still present. Patient completed Valacyclovir and has tried different lip balms.     Note dated 4/2/25:  SUMMARY:   Prescribed Valtrex for potential herpes simplex virus lesions

## 2025-04-30 ENCOUNTER — PATIENT MESSAGE (OUTPATIENT)
Dept: INTERNAL MEDICINE | Facility: CLINIC | Age: 75
End: 2025-04-30
Payer: MEDICARE

## 2025-04-30 RX ORDER — VALACYCLOVIR HYDROCHLORIDE 1 G/1
1000 TABLET, FILM COATED ORAL 2 TIMES DAILY
Qty: 28 TABLET | Refills: 11 | Status: SHIPPED | OUTPATIENT
Start: 2025-04-30 | End: 2025-05-14

## 2025-04-30 NOTE — TELEPHONE ENCOUNTER
"Sent pt a message in [employer] re:   "  It does not appear that she has a secondary infection from the photo.  A secondary infection is an infection on top of the primary infection.  In this case, the primary infection would be the cold sore from the herpes virus.  A secondary infection would be a bacterial infection in addition to that.      "  "

## 2025-04-30 NOTE — TELEPHONE ENCOUNTER
It does not appear that she has a secondary infection from the photo.  A secondary infection is an infection on top of the primary infection.  In this case, the primary infection would be the cold sore from the herpes virus.  A secondary infection would be a bacterial infection in addition to that.

## 2025-04-30 NOTE — TELEPHONE ENCOUNTER
I am adjusting the rx to allow for twice daily dosing for 7 days.  Please ask patient to take twice daily for 7 days.  Could also be secondarily infected.  Can she send a picture of her mouth?

## 2025-05-01 NOTE — TELEPHONE ENCOUNTER
"Sent pt a message in [employer] re:   "  Yes, patient can break the tablets in half and take half at a time.      "  "

## 2025-05-13 ENCOUNTER — TELEPHONE (OUTPATIENT)
Dept: PULMONOLOGY | Facility: CLINIC | Age: 75
End: 2025-05-13
Payer: MEDICARE

## 2025-05-13 NOTE — TELEPHONE ENCOUNTER
Left message on pt voicemail, informing her that I'm contacting her in regards to seeing if she can come into clinic on tomorrow and complete elio PFT because there is no room on schedule for pt to complete tests the same day but prior to her appointment. I also advised pt that if she wants to schedule appointment, she may contact the office.

## 2025-05-16 ENCOUNTER — OFFICE VISIT (OUTPATIENT)
Dept: PULMONOLOGY | Facility: CLINIC | Age: 75
End: 2025-05-16
Payer: MEDICARE

## 2025-05-16 VITALS
DIASTOLIC BLOOD PRESSURE: 72 MMHG | BODY MASS INDEX: 20.57 KG/M2 | HEART RATE: 81 BPM | SYSTOLIC BLOOD PRESSURE: 126 MMHG | OXYGEN SATURATION: 96 % | WEIGHT: 108.94 LBS | HEIGHT: 61 IN

## 2025-05-16 DIAGNOSIS — J47.9 BRONCHIECTASIS WITHOUT COMPLICATION: Primary | ICD-10-CM

## 2025-05-16 DIAGNOSIS — A31.0 NONTUBERCULOUS MYCOBACTERIAL PULMONARY DISEASE: ICD-10-CM

## 2025-05-16 PROCEDURE — 99999 PR PBB SHADOW E&M-EST. PATIENT-LVL III: CPT | Mod: PBBFAC,,, | Performed by: INTERNAL MEDICINE

## 2025-05-16 PROCEDURE — 99213 OFFICE O/P EST LOW 20 MIN: CPT | Mod: PBBFAC | Performed by: INTERNAL MEDICINE

## 2025-05-16 NOTE — PROGRESS NOTES
Subjective:       Patient ID: Danielle Longo is a 75 y.o. female.    Chief Complaint: No chief complaint on file.    HPI:   Danielle Longo is a 75 y.o. female last seen by me 11/25/24 who presents for follow up for bronchiectasis.    Today:  History of Present Illness    CHIEF COMPLAINT:  Patient presents today with chest discomfort and shortness of breath    RESPIRATORY SYMPTOMS:  She reports chest sensation without associated pain and easy shortness of breath. She has increased frequency of coughing with productive yellow sputum, particularly in the morning and after meals. The coughing episodes result in significant fatigue. She denies fever but reports occasional chills. She is currently taking Albuterol with sodium chloride and has expressed concerns about a possible allergic reaction to Albuterol.    ORAL HERPES AND ASSOCIATED SYMPTOMS:  She developed what appeared to be a fever blister above her upper lip, which is now starting to resolve with prescribed Valacyclovir. She has subsequently developed non-painful bumps around her lips. She experiences very dry and scaly lips with burning sensation, particularly noticeable while eating and drinking. Petroleum jelly provides limited relief due to need for frequent reapplication. She denies swelling of tongue or lips.    ANXIETY AND SLEEP:  She reports experiencing constant anxiety with increased generalized worrying. She gets only 6 hours of sleep per night but desires 7-8 hours. She experiences early morning awakening around 4-5 AM with racing thoughts and persistent worrying that prevents return to sleep. She also reports frequent nighttime urination.    FATIGUE:  She reports increased fatigue over the past month, particularly in the afternoons. She has decreased her walking distance and experiences significant tiredness with household activities. She denies weight loss.         11/25/24 HPI:  Denies any URI/LRTIs since her last visit. Denies worsening cough or  sputum production. Denies f/c/ns. Weight is stable. AFB positive again. CT Chest improved.    Airway clearance regimen is 1x/day. Remains productive of light yellow mucus.    Uses albuterol daily to facilitate sputum production.    5/31/24 HPI:  Energy and weight are stable. Respiratory symptoms unchanged. AFB on 11/16/23     Not able to exercise as much but does note temporary improvement in her dyspnea when she walked several days in a row.    Denies any URI/LRTIs.     12/7/23 HPI;  Started her airway clearance several weeks ago and reports productive sputum.  Appetite has significantly improved. Energy is also improved.     Exercising daily walking 2-3 miles/day.    Denies f/c/ns, malaise, fatigue.    AFB sent last month is smear neg and NGTD.    Initial Eval 10/25/23  Underwent a CT Chest recently after an abnormal CXR with symptoms of dyspnea, sinus sx, earache, f/c/ns, malaise and a cough that started in July. Other symptoms improved apart from cough and fatigue. Cough was productive and is now dry. Denies nighttime symptoms. Lost weight from 110 to 102 during the illness which she has gained back.     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     Walks daily in the park.     + Recurrent sinusitis    Denies chest pain, orthopnea, PND, LE edema, palpitations, syncope/presyncope    Denies GERD sx, globus, dysphagia    Denies f/c/ns    Denies rashes, myalgias, arthralgias, hair/nail changes, eye changes, raynauds, mucosal ulcerations        Exposures:  Work- Office work  Tobacco- quit in 1979  Inhalational Agents- Denies  Mold- No current mold. Did have mold after [place] that was removed  Pets- Cats  Birds- Denies  Hot tubs- Denies    Family History of Lung Disease: Denies  Childhood History of Lung Disease: Denies    Review of Systems    As above    Social History     Tobacco Use    Smoking status: Former     Current packs/day: 0.00     Types: Cigarettes     Start date: 3/20/1974      "Quit date: 1979     Years since quittin.6     Passive exposure: Never    Smokeless tobacco: Never    Tobacco comments:     social smoker, less than a pack a week   Substance Use Topics    Alcohol use: Yes     Alcohol/week: 2.0 standard drinks of alcohol     Types: 2 Glasses of wine per week     Comment: glass of wine 1-2 times a week       Review of patient's allergies indicates:  No Known Allergies  Past Medical History:   Diagnosis Date    Hyperlipidemia     Hypothyroidism     Thyroid disease      Past Surgical History:   Procedure Laterality Date    cataract       EYE SURGERY  2018    cataracts     Current Outpatient Medications on File Prior to Visit   Medication Sig    albuterol (ACCUNEB) 1.25 mg/3 mL Nebu Take 3 mLs (1.25 mg total) by nebulization once daily. Rescue    albuterol (PROAIR HFA) 90 mcg/actuation inhaler Inhale 2 puffs into the lungs every 6 (six) hours as needed for Wheezing. Rescue    levothyroxine (SYNTHROID) 50 MCG tablet TAKE 1 TABLET BEFORE BREAKFAST    mucus clearing device (ACAPELLA, FLUTTER) by Misc.(Non-Drug; Combo Route) route 2 (two) times a day.    rosuvastatin (CRESTOR) 10 MG tablet Take 1 tablet (10 mg total) by mouth once daily.    sodium chloride 3% 3 % nebulizer solution Take by nebulization 2 (two) times a day.    valACYclovir (VALTREX) 1000 MG tablet Take 1 tablet (1,000 mg total) by mouth 2 (two) times daily. for 14 days     No current facility-administered medications on file prior to visit.       Objective:      Vitals:    25 0933   BP: 126/72   BP Location: Left arm   Patient Position: Sitting   Pulse: 81   SpO2: 96%   Weight: 49.4 kg (108 lb 14.5 oz)   Height: 5' 1" (1.549 m)             Physical Exam   Constitutional: She appears well-developed and well-nourished.   HENT:   Nose: No mucosal edema.   Mouth/Throat: Oropharynx is clear and moist. Mallampati Score: I.   Neck: No tracheal deviation present.   Cardiovascular: Normal rate and regular rhythm. "   Pulmonary/Chest: Normal expansion, symmetric chest wall expansion and effort normal. No respiratory distress. She has no wheezes. She has no rhonchi. She has no rales.   Abdominal: She exhibits no distension.   Musculoskeletal:         General: No edema.      Cervical back: Neck supple.   Lymphadenopathy: No supraclavicular adenopathy is present.     She has no cervical adenopathy.   Skin: Skin is warm and dry. No cyanosis or erythema. Nails show no clubbing.   Nursing note and vitals reviewed.      Personal Diagnostic Review    Laboratory:  11/2023   AFB + M chelonae    5/22/23  Bicarb- 27  Eosinophils- 0.0    AFB   06/10/24- + Mycobacterium chelonae   11/16/23- + Mycobacterium chelonae     CT Chest 6/4/24- Images personally reviewed. I agree w/ the radiologist who notes;  1. Interval improvement of the bilateral consolidation and tree-in-bud nodules.  Persistent consolidation with bronchiectasis in the right middle lobe and lingula may represent atypical mycobacterial infectious process.  2. Grossly stable 7 mm nodule in the left upper lobe.  Consider CT follow-up in 6-12 months.  3. Additional findings.  Please see the above discussion.      CT Chest 12/4/23- Images personally reviewed. I agree w/ the radiologist who notes;  1. Compared to CT chest dated 09/07/2023: Findings remain consistent with atypical mycobacterial infection with interval mild improvement of superimposed infectious/inflammatory process in the lungs.  2. Stable left upper lobe perifissural nodule measuring 8 x 5 mm.  Consider follow-up by CT chest in 6-12 months.  3. Additional findings, as above.    CT Chest 9/7/23- Images personally reviewed. I agree w/ the radiologist who notes;  1. Bilateral consolidative changes in the right middle lobe and lingula with bronchiectasis/bronchiolectasis associated with bilateral patchy confluent tree in bud and irregular opacities as described above.  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  2. Incidental findings, as above.    CXR 9/6/23- Images personally reviewed. I agree w/ the radiologist who notes;  The trachea is unremarkable.  There is central bronchiectasis.  The cardiomediastinal silhouette is within normal limits.  There is no evidence of free air beneath the hemidiaphragms.  There are no pleural effusions.  There is no evidence of a pneumothorax.  There is no evidence of pneumomediastinum.  There are patchy reticulonodular densities throughout the right hemithorax.  There also scattered fibrotic changes.  There is no focal consolidation.     There are degenerative changes in the osseous structures.  There is levoconvex scoliosis of the thoracolumbar alignment.  There is no evidence of a fracture.     Additional evaluation with noncontrast CT scan of the chest, as clinically warranted.    PFTs   5/31/24  FVC: 2.00 (81.1 % predicted), FEV1: 1.40 (73.2 % predicted), FEV1/FVC:  70, TLC: 3.52 (79.3 % predicted), DLCO: 9.93 (52.3 % predicted)    Assessment:     Problem List Items Addressed This Visit       Bronchiectasis without complication - Primary (Chronic)    Relevant Orders    CT Chest Without Contrast    AFB Culture & Smear    Lung Volumes    DLCO-Carbon Monoxide Diffusing Capacity    Spirometry without Bronchodilator (Completed)    Nontuberculous mycobacterial pulmonary disease    Relevant Orders    CT Chest Without Contrast    AFB Culture & Smear    Lung Volumes    DLCO-Carbon Monoxide Diffusing Capacity    Spirometry without Bronchodilator (Completed)       Assessment & Plan    R05.1 Acute cough  T88.7XXA Unspecified adverse effect of drug or medicament, initial encounter  B00.1 Herpesviral vesicular dermatitis  F41.9 Anxiety disorder, unspecified  R53.83 Other fatigue  R35.1 Nocturia    IMPRESSION:  Considered possibility of allergic reaction to albuterol, but deemed unlikely due to long-term use without previous issues.  Suspect non-tuberculous mycobacterium  infection may be causing increased symptoms.  Consider sleep issues as potential cause of fatigue and shortness of breath if other tests are unremarkable.    PLAN SUMMARY:  - Increase walking as tolerated  - Order sputum culture  - Discontinue albuterol nebulizer for 1 week, then restart  - Order CT Chest to evaluate inflammation and mucus  - Order pulmonary function test  - Increase sodium chloride nebulizer to twice daily  - Use Acapella flutter device after nebulizer treatments  - Contact DME supplier for updated nebulizer supplies  - Consider dermatology referral if rash persists  - Follow up in a few months    COUGH AND RESPIRATORY ISSUES:  - Discussed importance of expectorating mucus to prevent worsening infection and symptoms.  - Patient to use Acapella flutter device after nebulizer treatments to help expectorate mucus.  - Increased sodium chloride nebulizer to twice daily.  - Ordered CT Chest to evaluate for increased inflammation or mucus; will send message with results once completed.  - Ordered sputum culture.  - Ordered pulmonary function test.  - Contact DME supplier for updated nebulizer supplies.    ADVERSE DRUG REACTION:  - Discontinued albuterol nebulizer for 1 week; restart after 1 week; if rash returns, discontinue and contact office.  - Consider dermatology referral if rash persists.    OTHER CONCERNS:  - Explained that bumps on back of tongue are likely normal taste buds.  - Patient to increase walking as tolerated.    FOLLOW-UP:  - Follow up in a few months.           37 minutes of total time spent on the encounter, which includes face to face time and non-face to face time preparing to see the patient (eg, review of tests), Obtaining and/or reviewing separately obtained history, Documenting clinical information in the electronic or other health record, Independently interpreting results (not separately reported) and communicating results to the patient/family/caregiver, or Care coordination  (not separately reported).      This note was generated with the assistance of ambient listening technology. Verbal consent was obtained by the patient and accompanying visitor(s) for the recording of patient appointment to facilitate this note. I attest to having reviewed and edited the generated note for accuracy, though some syntax or spelling errors may persist. Please contact the author of this note for any clarification.

## 2025-05-23 ENCOUNTER — HOSPITAL ENCOUNTER (OUTPATIENT)
Dept: PULMONOLOGY | Facility: CLINIC | Age: 75
Discharge: HOME | End: 2025-05-23
Payer: MEDICARE

## 2025-05-23 ENCOUNTER — HOSPITAL ENCOUNTER (OUTPATIENT)
Dept: RADIOLOGY | Facility: HOSPITAL | Age: 75
Discharge: HOME | End: 2025-05-23
Attending: INTERNAL MEDICINE
Payer: MEDICARE

## 2025-05-23 DIAGNOSIS — J47.9 BRONCHIECTASIS WITHOUT COMPLICATION: ICD-10-CM

## 2025-05-23 DIAGNOSIS — A31.0 NONTUBERCULOUS MYCOBACTERIAL PULMONARY DISEASE: ICD-10-CM

## 2025-05-23 LAB
DLCO SINGLE BREATH LLN: 13.1
DLCO SINGLE BREATH PRE REF: 49.7 %
DLCO SINGLE BREATH REF: 18.84
DLCOC SBVA LLN: 2.64
DLCOC SBVA REF: 4.25
DLCOC SINGLE BREATH LLN: 13.1
DLCOC SINGLE BREATH REF: 18.84
DLCOCSBVAULN: 5.85
DLCOCSINGLEBREATHULN: 24.57
DLCOSINGLEBREATHULN: 24.57
DLCOSINGLEBREATHZSCORE: -2.72
DLCOVA LLN: 2.64
DLCOVA PRE REF: 90.8 %
DLCOVA PRE: 3.86 ML/(MIN*MMHG*L) (ref 2.64–5.85)
DLCOVA REF: 4.25
DLCOVAULN: 5.85
ERVN2 LLN: -16449.44
ERVN2 PRE REF: 56.4 %
ERVN2 PRE: 0.31 L (ref -16449.44–16450.56)
ERVN2 REF: 0.56
ERVN2ULN: ABNORMAL
FEF 25 75 LLN: 0.94
FEF 25 75 PRE REF: 38.9 %
FEF 25 75 REF: 2.34
FEV05 LLN: 0.65
FEV05 REF: 1.5
FEV1 FVC LLN: 64
FEV1 FVC PRE REF: 96.9 %
FEV1 FVC REF: 78
FEV1 LLN: 1.35
FEV1 PRE REF: 63.2 %
FEV1 REF: 1.89
FEV1FVCZSCORE: -0.3
FEV1ZSCORE: -2.12
FRCN2 LLN: 1.72
FRCN2 PRE REF: 73.1 %
FRCN2 REF: 2.54
FRCN2ULN: 3.37
FVC LLN: 1.75
FVC PRE REF: 64.7 %
FVC REF: 2.44
FVCZSCORE: -2.07
ICN2REF: 1.61
IVC PRE: 1.58 L (ref 1.75–3.16)
IVC SINGLE BREATH LLN: 1.75
IVC SINGLE BREATH PRE REF: 64.8 %
IVC SINGLE BREATH REF: 2.44
IVCSINGLEBREATHULN: 3.16
LLN IC N2: -16448.39
PEF LLN: 3.32
PEF PRE REF: 86.2 %
PEF REF: 4.88
PHYSICIAN COMMENT: ABNORMAL
PRE DLCO: 9.36 ML/(MIN*MMHG) (ref 13.1–24.57)
PRE FEF 25 75: 0.91 L/S (ref 0.94–3.74)
PRE FET 100: 7.13 SEC
PRE FEV05 REF: 64.2 %
PRE FEV1 FVC: 75.64 % (ref 64.14–90.05)
PRE FEV1: 1.19 L (ref 1.35–2.4)
PRE FEV5: 0.96 L (ref 0.65–2.36)
PRE FRC N2: 1.86 L (ref 1.72–3.37)
PRE FVC: 1.58 L (ref 1.75–3.16)
PRE IC N2: 1.28 L (ref -16448.39–16451.61)
PRE PEF: 4.21 L/S (ref 3.32–6.43)
PRE REF IC N2: 79.5 %
RVN2 LLN: 1.41
RVN2 PRE REF: 77.8 %
RVN2 PRE: 1.55 L (ref 1.41–2.56)
RVN2 REF: 1.99
RVN2TLCN2 LLN: 34.53
RVN2TLCN2 PRE REF: 111.7 %
RVN2TLCN2 PRE: 49.29 % (ref 34.53–53.71)
RVN2TLCN2 REF: 44.12
RVN2TLCN2ULN: 53.71
RVN2ULN: 2.56
TLCN2 LLN: 3.45
TLCN2 PRE REF: 70.8 %
TLCN2 PRE: 3.14 L (ref 3.45–5.42)
TLCN2 REF: 4.44
TLCN2ULN: 5.42
TLCN2ZSCORE: -2.16
ULN IC N2: ABNORMAL
VA PRE: 2.43 L (ref 4.29–4.29)
VA SINGLE BREATH LLN: 4.29
VA SINGLE BREATH PRE REF: 56.7 %
VA SINGLE BREATH REF: 4.29
VASINGLEBREATHULN: 4.29
VCMAXN2 LLN: 1.75
VCMAXN2 PRE REF: 65.3 %
VCMAXN2 PRE: 1.59 L (ref 1.75–3.16)
VCMAXN2 REF: 2.44
VCMAXN2ULN: 3.16

## 2025-05-23 PROCEDURE — 94727 GAS DIL/WSHOT DETER LNG VOL: CPT | Mod: PBBFAC | Performed by: INTERNAL MEDICINE

## 2025-05-23 PROCEDURE — 94010 BREATHING CAPACITY TEST: CPT | Mod: PBBFAC | Performed by: INTERNAL MEDICINE

## 2025-05-23 PROCEDURE — 94729 DIFFUSING CAPACITY: CPT | Mod: PBBFAC | Performed by: INTERNAL MEDICINE

## 2025-05-26 ENCOUNTER — HOSPITAL ENCOUNTER (OUTPATIENT)
Dept: RADIOLOGY | Facility: HOSPITAL | Age: 75
Discharge: HOME | End: 2025-05-26
Attending: INTERNAL MEDICINE
Payer: MEDICARE

## 2025-05-26 PROCEDURE — 71250 CT THORAX DX C-: CPT | Mod: TC

## 2025-05-27 ENCOUNTER — LAB VISIT (OUTPATIENT)
Dept: LAB | Facility: HOSPITAL | Age: 75
End: 2025-05-27
Attending: INTERNAL MEDICINE
Payer: MEDICARE

## 2025-05-27 DIAGNOSIS — J47.9 BRONCHIECTASIS WITHOUT COMPLICATION: ICD-10-CM

## 2025-05-27 DIAGNOSIS — A31.0 NONTUBERCULOUS MYCOBACTERIAL PULMONARY DISEASE: ICD-10-CM

## 2025-05-27 PROCEDURE — 87206 SMEAR FLUORESCENT/ACID STAI: CPT

## 2025-05-27 PROCEDURE — 87116 MYCOBACTERIA CULTURE: CPT

## 2025-05-28 LAB — ACID FAST MOD KINY STN SPEC: NORMAL

## 2025-06-03 LAB — MYCOBACTERIUM SPEC QL CULT: NORMAL

## 2025-06-06 ENCOUNTER — RESULTS FOLLOW-UP (OUTPATIENT)
Dept: CRITICAL CARE MEDICINE | Facility: HOSPITAL | Age: 75
End: 2025-06-06

## 2025-06-11 RX ORDER — LEVOTHYROXINE SODIUM 50 UG/1
50 TABLET ORAL
Qty: 90 TABLET | Refills: 3 | Status: SHIPPED | OUTPATIENT
Start: 2025-06-11

## 2025-06-11 NOTE — TELEPHONE ENCOUNTER
No care due was identified.  [facility] Embedded Care Due Messages. Reference number: [id number].   6/11/2025 2:46:41 AM CDT

## 2025-06-11 NOTE — TELEPHONE ENCOUNTER
Refill Decision Note   Danielle Longo  is requesting a refill authorization.  Brief Assessment and Rationale for Refill:  Approve     Medication Therapy Plan:         Comments:     Note composed:11:00 AM 06/11/2025             Appointments     Last Visit   4/2/2025 Pepe Peterson MD   Next Visit   Visit date not found Pepe Peterson MD

## 2025-08-26 RX ORDER — ALBUTEROL SULFATE 1.25 MG/3ML
SOLUTION RESPIRATORY (INHALATION)
Qty: 225 ML | Refills: 3 | Status: SHIPPED | OUTPATIENT
Start: 2025-08-26